# Patient Record
Sex: MALE | Race: WHITE | Employment: FULL TIME | URBAN - METROPOLITAN AREA
[De-identification: names, ages, dates, MRNs, and addresses within clinical notes are randomized per-mention and may not be internally consistent; named-entity substitution may affect disease eponyms.]

---

## 2017-01-17 PROBLEM — E10.42 TYPE 1 DIABETES MELLITUS WITH DIABETIC POLYNEUROPATHY (HCC): Status: ACTIVE | Noted: 2017-01-17

## 2017-06-24 PROCEDURE — 82570 ASSAY OF URINE CREATININE: CPT | Performed by: INTERNAL MEDICINE

## 2017-06-24 PROCEDURE — 82043 UR ALBUMIN QUANTITATIVE: CPT | Performed by: INTERNAL MEDICINE

## 2017-07-25 PROBLEM — E03.9 ACQUIRED HYPOTHYROIDISM: Status: ACTIVE | Noted: 2017-07-25

## 2017-07-25 PROBLEM — E10.649 TYPE 1 DIABETES MELLITUS WITH HYPOGLYCEMIA UNAWARENESS (HCC): Status: ACTIVE | Noted: 2017-07-25

## 2019-07-09 PROCEDURE — 86803 HEPATITIS C AB TEST: CPT | Performed by: INTERNAL MEDICINE

## 2019-07-11 PROBLEM — I25.10 CORONARY ARTERY DISEASE INVOLVING NATIVE CORONARY ARTERY: Status: ACTIVE | Noted: 2019-07-11

## 2020-10-01 ENCOUNTER — OFFICE VISIT (OUTPATIENT)
Dept: OPHTHALMOLOGY | Facility: CLINIC | Age: 60
End: 2020-10-01
Payer: COMMERCIAL

## 2020-10-01 DIAGNOSIS — H25.13 AGE-RELATED NUCLEAR CATARACT OF BOTH EYES: ICD-10-CM

## 2020-10-01 DIAGNOSIS — E10.3292 CONTROLLED TYPE 1 DIABETES MELLITUS WITH LEFT EYE AFFECTED BY MILD NONPROLIFERATIVE RETINOPATHY WITHOUT MACULAR EDEMA (HCC): ICD-10-CM

## 2020-10-01 DIAGNOSIS — H40.003 GLAUCOMA SUSPECT OF BOTH EYES: Primary | ICD-10-CM

## 2020-10-01 PROCEDURE — 92250 FUNDUS PHOTOGRAPHY W/I&R: CPT | Performed by: OPHTHALMOLOGY

## 2020-10-01 PROCEDURE — 92015 DETERMINE REFRACTIVE STATE: CPT | Performed by: OPHTHALMOLOGY

## 2020-10-01 PROCEDURE — 92004 COMPRE OPH EXAM NEW PT 1/>: CPT | Performed by: OPHTHALMOLOGY

## 2020-10-01 NOTE — PROGRESS NOTES
Edilson Hull is a 64year old male.     HPI:     HPI     Diabetic Eye Exam      Additional comments: Pt has been a diabetic for 64 years       Pt's diabetes is currently controlled by insulin  Pt checks BS 4x a day   Pt's last blood sugar was 135  Last CORRECTION SCALE, MAX DOSE OF 40 UNITS SUBCUTANEOUS DAILY 45 mL 1   • Glucose Blood (CONTOUR NEXT TEST) In Vitro Strip TEST THREE TIMES DAILY 300 strip 3   • Insulin Pen Needle (BD PEN NEEDLE RIKI U/F) 32G X 4 MM Does not apply Misc Use 5 times daily 500 e MA above fovea     Vessels Normal Normal    Periphery Normal Normal            Refraction     Wearing Rx       Sphere Cylinder Axis Add    Right -3.25 +1.25 115 +1.75    Left -4.00 +1.25 090 +1.75    Type: Progressive bifocal          Manifest Refraction

## 2020-10-01 NOTE — ASSESSMENT & PLAN NOTE
Discussed with patient that he is a glaucoma suspect based on enlargement of the optic nerve in the left eye. Photos were taken today to document optic nerves.    Will have patient back for next available visual field, OCT and pachy with no MD. Spoke to patient. All questions answered. Advised patient will need to schedule an appointment with his PCP for the medical clearance. Verbalized understanding.

## 2020-10-01 NOTE — ASSESSMENT & PLAN NOTE
Mild diabetic changes in the left eye. No treatment is needed at this time. Told patient he does not need to see Dr. Aiyana Juarez at this time, but it would be up to the patient and Dr. Aiyana Juarez.

## 2020-10-01 NOTE — PATIENT INSTRUCTIONS
Glaucoma suspect of both eyes  Discussed with patient that he is a glaucoma suspect based on enlargement of the optic nerve in the left eye. Photos were taken today to document optic nerves.    Will have patient back for next available visual field, OCT It usually comes on quickly. The drainage area in the eye suddenly becomes completely blocked. Eye pressure builds rapidly. You may notice blurred vision and rainbow halos around lights. You may also have headaches, nausea, vomiting, and severe pain.  If no eye exam  Your eye healthcare provider uses an eye chart and other tools to check your vision. Then he or she checks your eyes for any disease. You are given eye drops to widen (dilate) your pupils.  You may have 1 or more of these tests:   · Tonometry.  Th your health. Managing your diabetes and other health problems can make this eye condition less likely. You must also see an eye care provider about once a year.  They will do a dilated eye exam. This helps find any problems with the blood vessels of your re Retinopathy? Diabetic retinopathy is the main cause of blindness in adults. It happens when diabetes harms blood vessels in the eye. These weak vessels leak fluid into a part of the eye called the retina.  New blood vessels can break and bleed into the ret

## 2020-11-18 ENCOUNTER — NURSE ONLY (OUTPATIENT)
Dept: OPHTHALMOLOGY | Facility: CLINIC | Age: 60
End: 2020-11-18
Payer: COMMERCIAL

## 2020-11-18 DIAGNOSIS — H40.003 GLAUCOMA SUSPECT OF BOTH EYES: ICD-10-CM

## 2020-11-18 PROCEDURE — 92083 EXTENDED VISUAL FIELD XM: CPT | Performed by: OPHTHALMOLOGY

## 2020-11-18 PROCEDURE — 92133 CPTRZD OPH DX IMG PST SGM ON: CPT | Performed by: OPHTHALMOLOGY

## 2020-11-18 PROCEDURE — 76514 ECHO EXAM OF EYE THICKNESS: CPT | Performed by: OPHTHALMOLOGY

## 2020-11-18 NOTE — PROGRESS NOTES
Robert Comment is a 64year old male.     HPI:     HPI     Pt in today for VF, OCT and Pachy with no MD.     Last edited by Lillie Tobin OT on 11/18/2020  8:04 AM. (History)        Patient History:  Past Medical History:   Diagnosis Date   • Alcoholi nightly. 100 tablet 3   • aspirin 81 MG Oral Tab Take 1 tablet (81 mg total) by mouth daily.  100 tablet 3       Allergies:  No Known Allergies    ROS:       PHYSICAL EXAM:     Base Eye Exam     Pachymetry (11/18/2020)       Right Left    Thickness 579/-2 5

## 2020-11-19 ENCOUNTER — TELEPHONE (OUTPATIENT)
Dept: OPHTHALMOLOGY | Facility: CLINIC | Age: 60
End: 2020-11-19

## 2021-01-26 PROBLEM — E10.3292 CONTROLLED TYPE 1 DIABETES MELLITUS WITH LEFT EYE AFFECTED BY MILD NONPROLIFERATIVE RETINOPATHY WITHOUT MACULAR EDEMA (HCC): Status: RESOLVED | Noted: 2017-07-25 | Resolved: 2021-01-26

## 2022-12-14 ENCOUNTER — OFFICE VISIT (OUTPATIENT)
Dept: OPHTHALMOLOGY | Facility: CLINIC | Age: 62
End: 2022-12-14
Payer: COMMERCIAL

## 2022-12-14 DIAGNOSIS — E10.9 TYPE 1 DIABETES MELLITUS WITHOUT RETINOPATHY (HCC): ICD-10-CM

## 2022-12-14 DIAGNOSIS — H40.003 GLAUCOMA SUSPECT OF BOTH EYES: Primary | ICD-10-CM

## 2022-12-14 DIAGNOSIS — H25.13 AGE-RELATED NUCLEAR CATARACT OF BOTH EYES: ICD-10-CM

## 2022-12-14 NOTE — ASSESSMENT & PLAN NOTE
Discussed early cataracts with patient. Told patient that cataracts are age appropriate and they are not surgical at this time. No treatment recommended at this time.      New glasses Rx given today, update as needed 1

## 2022-12-14 NOTE — ASSESSMENT & PLAN NOTE
Discussed with patient that he is a glaucoma suspect based on increased cupping of the optic nerves in both eyes. Retinal photos taken today to document optic nerves. Glaucoma diagnostic testing ordered. Will not start medication, but will continue to observe. Patient verbalized understanding.     Will see patient for next available visual field and OCT with no MD, if diagnostic testing is normal will see patient in 1 year for a diabetic exam

## 2022-12-27 ENCOUNTER — NURSE ONLY (OUTPATIENT)
Dept: OPHTHALMOLOGY | Facility: CLINIC | Age: 62
End: 2022-12-27
Payer: COMMERCIAL

## 2022-12-27 DIAGNOSIS — H40.003 GLAUCOMA SUSPECT OF BOTH EYES: ICD-10-CM

## 2022-12-27 PROCEDURE — 92133 CPTRZD OPH DX IMG PST SGM ON: CPT | Performed by: OPHTHALMOLOGY

## 2022-12-27 PROCEDURE — 92083 EXTENDED VISUAL FIELD XM: CPT | Performed by: OPHTHALMOLOGY

## 2023-09-18 ENCOUNTER — APPOINTMENT (OUTPATIENT)
Dept: CT IMAGING | Age: 63
DRG: 234 | End: 2023-09-18
Attending: EMERGENCY MEDICINE

## 2023-09-18 ENCOUNTER — APPOINTMENT (OUTPATIENT)
Dept: GENERAL RADIOLOGY | Age: 63
DRG: 234 | End: 2023-09-18
Attending: EMERGENCY MEDICINE

## 2023-09-18 ENCOUNTER — HOSPITAL ENCOUNTER (INPATIENT)
Age: 63
LOS: 7 days | Discharge: HOME HEALTH | DRG: 234 | End: 2023-09-25
Attending: EMERGENCY MEDICINE | Admitting: HOSPITALIST

## 2023-09-18 DIAGNOSIS — G89.18 POST-OP PAIN: ICD-10-CM

## 2023-09-18 DIAGNOSIS — I21.4 NSTEMI (NON-ST ELEVATED MYOCARDIAL INFARCTION) (CMD): Primary | ICD-10-CM

## 2023-09-18 LAB
ALBUMIN SERPL-MCNC: 3.9 G/DL (ref 3.6–5.1)
ALBUMIN/GLOB SERPL: 1.2 {RATIO} (ref 1–2.4)
ALP SERPL-CCNC: 63 UNITS/L (ref 45–117)
ALT SERPL-CCNC: 40 UNITS/L
ANION GAP SERPL CALC-SCNC: 9 MMOL/L (ref 7–19)
APTT PPP: 28 SEC (ref 22–32)
AST SERPL-CCNC: 74 UNITS/L
BASOPHILS # BLD: 0.1 K/MCL (ref 0–0.3)
BASOPHILS NFR BLD: 1 %
BILIRUB SERPL-MCNC: 0.9 MG/DL (ref 0.2–1)
BUN SERPL-MCNC: 23 MG/DL (ref 6–20)
BUN/CREAT SERPL: 13 (ref 7–25)
CALCIUM SERPL-MCNC: 9.5 MG/DL (ref 8.4–10.2)
CHLORIDE SERPL-SCNC: 105 MMOL/L (ref 97–110)
CO2 SERPL-SCNC: 27 MMOL/L (ref 21–32)
CREAT SERPL-MCNC: 1.73 MG/DL (ref 0.67–1.17)
DEPRECATED RDW RBC: 39.8 FL (ref 39–50)
EGFRCR SERPLBLD CKD-EPI 2021: 44 ML/MIN/{1.73_M2}
EOSINOPHIL # BLD: 0.1 K/MCL (ref 0–0.5)
EOSINOPHIL NFR BLD: 1 %
ERYTHROCYTE [DISTWIDTH] IN BLOOD: 12.1 % (ref 11–15)
FASTING DURATION TIME PATIENT: ABNORMAL H
GLOBULIN SER-MCNC: 3.3 G/DL (ref 2–4)
GLUCOSE SERPL-MCNC: 202 MG/DL (ref 70–99)
HCT VFR BLD CALC: 42.7 % (ref 39–51)
HGB BLD-MCNC: 14.4 G/DL (ref 13–17)
IMM GRANULOCYTES # BLD AUTO: 0 K/MCL (ref 0–0.2)
IMM GRANULOCYTES # BLD: 0 %
INR PPP: 1
LYMPHOCYTES # BLD: 1.7 K/MCL (ref 1–4)
LYMPHOCYTES NFR BLD: 15 %
MCH RBC QN AUTO: 30.2 PG (ref 26–34)
MCHC RBC AUTO-ENTMCNC: 33.7 G/DL (ref 32–36.5)
MCV RBC AUTO: 89.5 FL (ref 78–100)
MONOCYTES # BLD: 1.1 K/MCL (ref 0.3–0.9)
MONOCYTES NFR BLD: 10 %
NEUTROPHILS # BLD: 7.8 K/MCL (ref 1.8–7.7)
NEUTROPHILS NFR BLD: 73 %
NRBC BLD MANUAL-RTO: 0 /100 WBC
PLATELET # BLD AUTO: 216 K/MCL (ref 140–450)
POTASSIUM SERPL-SCNC: 3.8 MMOL/L (ref 3.4–5.1)
PROT SERPL-MCNC: 7.2 G/DL (ref 6.4–8.2)
PROTHROMBIN TIME: 10.9 SEC (ref 9.7–11.8)
RBC # BLD: 4.77 MIL/MCL (ref 4.5–5.9)
SODIUM SERPL-SCNC: 137 MMOL/L (ref 135–145)
TROPONIN I SERPL DL<=0.01 NG/ML-MCNC: 4426 NG/L
TROPONIN I SERPL DL<=0.01 NG/ML-MCNC: 7284 NG/L
WBC # BLD: 10.7 K/MCL (ref 4.2–11)

## 2023-09-18 PROCEDURE — 96372 THER/PROPH/DIAG INJ SC/IM: CPT | Performed by: HOSPITALIST

## 2023-09-18 PROCEDURE — 71045 X-RAY EXAM CHEST 1 VIEW: CPT

## 2023-09-18 PROCEDURE — 10006031 HB ROOM CHARGE TELEMETRY

## 2023-09-18 PROCEDURE — 74174 CTA ABD&PLVS W/CONTRAST: CPT

## 2023-09-18 PROCEDURE — G1004 CDSM NDSC: HCPCS

## 2023-09-18 PROCEDURE — 10002800 HB RX 250 W HCPCS: Performed by: HOSPITALIST

## 2023-09-18 PROCEDURE — 10004651 HB RX, NO CHARGE ITEM: Performed by: EMERGENCY MEDICINE

## 2023-09-18 PROCEDURE — 10002803 HB RX 637: Performed by: EMERGENCY MEDICINE

## 2023-09-18 PROCEDURE — 84484 ASSAY OF TROPONIN QUANT: CPT | Performed by: EMERGENCY MEDICINE

## 2023-09-18 PROCEDURE — 83036 HEMOGLOBIN GLYCOSYLATED A1C: CPT | Performed by: PHYSICIAN ASSISTANT

## 2023-09-18 PROCEDURE — 10002800 HB RX 250 W HCPCS: Performed by: EMERGENCY MEDICINE

## 2023-09-18 PROCEDURE — 80053 COMPREHEN METABOLIC PANEL: CPT | Performed by: EMERGENCY MEDICINE

## 2023-09-18 PROCEDURE — 99291 CRITICAL CARE FIRST HOUR: CPT

## 2023-09-18 PROCEDURE — 93005 ELECTROCARDIOGRAM TRACING: CPT | Performed by: EMERGENCY MEDICINE

## 2023-09-18 PROCEDURE — 85025 COMPLETE CBC W/AUTO DIFF WBC: CPT | Performed by: EMERGENCY MEDICINE

## 2023-09-18 PROCEDURE — 85610 PROTHROMBIN TIME: CPT | Performed by: EMERGENCY MEDICINE

## 2023-09-18 PROCEDURE — 93005 ELECTROCARDIOGRAM TRACING: CPT | Performed by: HOSPITALIST

## 2023-09-18 PROCEDURE — 10002805 HB CONTRAST AGENT: Performed by: EMERGENCY MEDICINE

## 2023-09-18 PROCEDURE — 71275 CT ANGIOGRAPHY CHEST: CPT

## 2023-09-18 PROCEDURE — 96374 THER/PROPH/DIAG INJ IV PUSH: CPT

## 2023-09-18 PROCEDURE — 85730 THROMBOPLASTIN TIME PARTIAL: CPT | Performed by: EMERGENCY MEDICINE

## 2023-09-18 PROCEDURE — 93010 ELECTROCARDIOGRAM REPORT: CPT | Performed by: SPECIALIST

## 2023-09-18 RX ORDER — 0.9 % SODIUM CHLORIDE 0.9 %
2 VIAL (ML) INJECTION EVERY 12 HOURS SCHEDULED
Status: DISCONTINUED | OUTPATIENT
Start: 2023-09-19 | End: 2023-09-20

## 2023-09-18 RX ORDER — HEPARIN SODIUM 1000 [USP'U]/ML
60 INJECTION, SOLUTION INTRAVENOUS; SUBCUTANEOUS PRN
Status: DISCONTINUED | OUTPATIENT
Start: 2023-09-18 | End: 2023-09-20

## 2023-09-18 RX ORDER — HEPARIN SODIUM 10000 [USP'U]/100ML
1-30 INJECTION, SOLUTION INTRAVENOUS CONTINUOUS
Status: DISCONTINUED | OUTPATIENT
Start: 2023-09-18 | End: 2023-09-20

## 2023-09-18 RX ORDER — INSULIN GLARGINE 100 [IU]/ML
14 INJECTION, SOLUTION SUBCUTANEOUS ONCE
Status: COMPLETED | OUTPATIENT
Start: 2023-09-19 | End: 2023-09-18

## 2023-09-18 RX ORDER — ASPIRIN 81 MG/1
324 TABLET, CHEWABLE ORAL ONCE
Status: COMPLETED | OUTPATIENT
Start: 2023-09-18 | End: 2023-09-18

## 2023-09-18 RX ORDER — HEPARIN SODIUM 1000 [USP'U]/ML
60 INJECTION, SOLUTION INTRAVENOUS; SUBCUTANEOUS ONCE
Status: COMPLETED | OUTPATIENT
Start: 2023-09-18 | End: 2023-09-18

## 2023-09-18 RX ORDER — HEPARIN SODIUM 1000 [USP'U]/ML
30 INJECTION, SOLUTION INTRAVENOUS; SUBCUTANEOUS PRN
Status: DISCONTINUED | OUTPATIENT
Start: 2023-09-18 | End: 2023-09-20

## 2023-09-18 RX ADMIN — IOHEXOL 75 ML: 350 INJECTION, SOLUTION INTRAVENOUS at 20:18

## 2023-09-18 RX ADMIN — INSULIN GLARGINE 14 UNITS: 100 INJECTION, SOLUTION SUBCUTANEOUS at 23:43

## 2023-09-18 RX ADMIN — NITROGLYCERIN 1 INCH: 20 OINTMENT TOPICAL at 20:30

## 2023-09-18 RX ADMIN — HEPARIN SODIUM 3800 UNITS: 1000 INJECTION, SOLUTION INTRAVENOUS; SUBCUTANEOUS at 21:12

## 2023-09-18 RX ADMIN — ASPIRIN 81 MG CHEWABLE TABLET 324 MG: 81 TABLET CHEWABLE at 20:08

## 2023-09-18 RX ADMIN — HEPARIN SODIUM AND DEXTROSE 12 UNITS/KG/HR: 10000; 5 INJECTION INTRAVENOUS at 21:11

## 2023-09-18 SDOH — SOCIAL STABILITY: SOCIAL NETWORK: SUPPORT SYSTEMS: CHILDREN;FAMILY MEMBERS

## 2023-09-18 SDOH — ECONOMIC STABILITY: GENERAL

## 2023-09-18 SDOH — HEALTH STABILITY: PHYSICAL HEALTH: DO YOU HAVE DIFFICULTY DRESSING OR BATHING?: NO

## 2023-09-18 SDOH — ECONOMIC STABILITY: TRANSPORTATION INSECURITY
IN THE PAST 12 MONTHS, HAS THE LACK OF TRANSPORTATION KEPT YOU FROM MEDICAL APPOINTMENTS OR FROM GETTING MEDICATIONS?: NO

## 2023-09-18 SDOH — HEALTH STABILITY: PHYSICAL HEALTH: DO YOU HAVE SERIOUS DIFFICULTY WALKING OR CLIMBING STAIRS?: NO

## 2023-09-18 SDOH — ECONOMIC STABILITY: HOUSING INSECURITY: WHAT IS YOUR LIVING SITUATION TODAY?: HOUSE

## 2023-09-18 SDOH — ECONOMIC STABILITY: TRANSPORTATION INSECURITY
IN THE PAST 12 MONTHS, HAS LACK OF TRANSPORTATION KEPT YOU FROM MEETINGS, WORK, OR FROM GETTING THINGS NEEDED FOR DAILY LIVING?: NO

## 2023-09-18 SDOH — HEALTH STABILITY: GENERAL
BECAUSE OF A PHYSICAL, MENTAL, OR EMOTIONAL CONDITION, DO YOU HAVE SERIOUS DIFFICULTY CONCENTRATING, REMEMBERING OR MAKING DECISIONS?: NO

## 2023-09-18 SDOH — SOCIAL STABILITY: SOCIAL NETWORK
HOW OFTEN DO YOU SEE OR TALK TO PEOPLE THAT YOU CARE ABOUT AND FEEL CLOSE TO? (FOR EXAMPLE: TALKING TO FRIENDS ON THE PHONE, VISITING FRIENDS OR FAMILY, GOING TO CHURCH OR CLUB MEETINGS): 5 OR MORE TIMES A WEEK

## 2023-09-18 SDOH — HEALTH STABILITY: GENERAL: BECAUSE OF A PHYSICAL, MENTAL, OR EMOTIONAL CONDITION, DO YOU HAVE DIFFICULTY DOING ERRANDS ALONE?: NO

## 2023-09-18 SDOH — ECONOMIC STABILITY: HOUSING INSECURITY: WHAT IS YOUR LIVING SITUATION TODAY?: SPOUSE;ADULT CHILDREN

## 2023-09-18 SDOH — ECONOMIC STABILITY: HOUSING INSECURITY: ARE YOU WORRIED ABOUT LOSING YOUR HOUSING?: NO

## 2023-09-18 SDOH — ECONOMIC STABILITY: FOOD INSECURITY: HOW OFTEN IN THE PAST 12 MONTHS WERE YOU WORRIED OR STRESSED ABOUT HAVING ENOUGH MONEY TO BUY NUTRITIOUS MEALS?: NEVER

## 2023-09-18 ASSESSMENT — PAIN SCALES - GENERAL
PAINLEVEL_OUTOF10: 2
PAINLEVEL_OUTOF10: 0
PAINLEVEL_OUTOF10: 5

## 2023-09-18 ASSESSMENT — ENCOUNTER SYMPTOMS
HEADACHES: 0
NUMBNESS: 1
AGITATION: 0
FEVER: 0
SORE THROAT: 0
COUGH: 0
CHILLS: 0
VOMITING: 0
NAUSEA: 0
ABDOMINAL PAIN: 0
SHORTNESS OF BREATH: 0

## 2023-09-18 ASSESSMENT — LIFESTYLE VARIABLES
HOW OFTEN DO YOU HAVE A DRINK CONTAINING ALCOHOL: NEVER
HOW MANY STANDARD DRINKS CONTAINING ALCOHOL DO YOU HAVE ON A TYPICAL DAY: 0,1 OR 2
AUDIT-C TOTAL SCORE: 0
ALCOHOL_USE_STATUS: NO OR LOW RISK WITH VALIDATED TOOL
HOW OFTEN DO YOU HAVE 6 OR MORE DRINKS ON ONE OCCASION: NEVER

## 2023-09-18 ASSESSMENT — ACTIVITIES OF DAILY LIVING (ADL)
RECENT_DECLINE_ADL: NO
ADL_SHORT_OF_BREATH: NO
ADL_BEFORE_ADMISSION: INDEPENDENT
ADL_SCORE: 12

## 2023-09-18 ASSESSMENT — PATIENT HEALTH QUESTIONNAIRE - PHQ9
IS PATIENT ABLE TO COMPLETE PHQ2 OR PHQ9: YES
SUM OF ALL RESPONSES TO PHQ9 QUESTIONS 1 AND 2: 0
CLINICAL INTERPRETATION OF PHQ2 SCORE: NO FURTHER SCREENING NEEDED

## 2023-09-19 ENCOUNTER — APPOINTMENT (OUTPATIENT)
Dept: ULTRASOUND IMAGING | Age: 63
DRG: 234 | End: 2023-09-19
Attending: NURSE PRACTITIONER

## 2023-09-19 ENCOUNTER — APPOINTMENT (OUTPATIENT)
Dept: CARDIOLOGY | Age: 63
DRG: 234 | End: 2023-09-19
Attending: NURSE PRACTITIONER

## 2023-09-19 ENCOUNTER — ANESTHESIA EVENT (OUTPATIENT)
Dept: SURGERY | Age: 63
DRG: 234 | End: 2023-09-19

## 2023-09-19 LAB
ABO + RH BLD: NORMAL
ANION GAP SERPL CALC-SCNC: 9 MMOL/L (ref 7–19)
APPEARANCE UR: CLEAR
APTT PPP: 44 SEC (ref 22–32)
APTT PPP: 50 SEC (ref 22–32)
APTT PPP: 53 SEC (ref 22–32)
ASCENDING AORTA (AAD): 3
ATRIAL RATE (BPM): 59
ATRIAL RATE (BPM): 72
ATRIAL RATE (BPM): 72
AV STENOSIS SEVERITY TEXT: NORMAL
BASOPHILS # BLD: 0.1 K/MCL (ref 0–0.3)
BASOPHILS NFR BLD: 1 %
BILIRUB UR QL STRIP: NEGATIVE
BLD GP AB SCN SERPL QL GEL: NEGATIVE
BUN SERPL-MCNC: 22 MG/DL (ref 6–20)
BUN/CREAT SERPL: 22 (ref 7–25)
CALCIUM SERPL-MCNC: 8.5 MG/DL (ref 8.4–10.2)
CHLORIDE SERPL-SCNC: 107 MMOL/L (ref 97–110)
CO2 SERPL-SCNC: 25 MMOL/L (ref 21–32)
COLOR UR: ABNORMAL
CREAT SERPL-MCNC: 1.01 MG/DL (ref 0.67–1.17)
DEPRECATED RDW RBC: 40.7 FL (ref 39–50)
EGFRCR SERPLBLD CKD-EPI 2021: 85 ML/MIN/{1.73_M2}
EOSINOPHIL # BLD: 0.1 K/MCL (ref 0–0.5)
EOSINOPHIL NFR BLD: 1 %
ERYTHROCYTE [DISTWIDTH] IN BLOOD: 12.3 % (ref 11–15)
FASTING DURATION TIME PATIENT: ABNORMAL H
GLUCOSE BLDC GLUCOMTR-MCNC: 297 MG/DL (ref 70–99)
GLUCOSE SERPL-MCNC: 242 MG/DL (ref 70–99)
GLUCOSE UR STRIP-MCNC: 300 MG/DL
HCT VFR BLD CALC: 38.1 % (ref 39–51)
HGB BLD-MCNC: 12.7 G/DL (ref 13–17)
HGB UR QL STRIP: NEGATIVE
IMM GRANULOCYTES # BLD AUTO: 0 K/MCL (ref 0–0.2)
IMM GRANULOCYTES # BLD: 0 %
KETONES UR STRIP-MCNC: NEGATIVE MG/DL
LEUKOCYTE ESTERASE UR QL STRIP: NEGATIVE
LV EF: NORMAL %
LYMPHOCYTES # BLD: 1.4 K/MCL (ref 1–4)
LYMPHOCYTES NFR BLD: 15 %
MAGNESIUM SERPL-MCNC: 2.2 MG/DL (ref 1.7–2.4)
MCH RBC QN AUTO: 30 PG (ref 26–34)
MCHC RBC AUTO-ENTMCNC: 33.3 G/DL (ref 32–36.5)
MCV RBC AUTO: 90.1 FL (ref 78–100)
MONOCYTES # BLD: 0.9 K/MCL (ref 0.3–0.9)
MONOCYTES NFR BLD: 10 %
MRSA DNA SPEC QL NAA+PROBE: NOT DETECTED
NEUTROPHILS # BLD: 7 K/MCL (ref 1.8–7.7)
NEUTROPHILS NFR BLD: 73 %
NITRITE UR QL STRIP: NEGATIVE
NRBC BLD MANUAL-RTO: 0 /100 WBC
P AXIS (DEGREES): 35
P AXIS (DEGREES): 37
P AXIS (DEGREES): 54
PH UR STRIP: 6 [PH] (ref 5–7)
PLATELET # BLD AUTO: 178 K/MCL (ref 140–450)
POTASSIUM SERPL-SCNC: 3.9 MMOL/L (ref 3.4–5.1)
PR-INTERVAL (MSEC): 120
PR-INTERVAL (MSEC): 134
PR-INTERVAL (MSEC): 142
PROT UR STRIP-MCNC: NEGATIVE MG/DL
QRS-INTERVAL (MSEC): 84
QRS-INTERVAL (MSEC): 90
QRS-INTERVAL (MSEC): 94
QT-INTERVAL (MSEC): 406
QT-INTERVAL (MSEC): 428
QT-INTERVAL (MSEC): 438
QTC: 434
QTC: 444
QTC: 468
R AXIS (DEGREES): 4
R AXIS (DEGREES): 44
R AXIS (DEGREES): 61
RAINBOW EXTRA TUBES HOLD SPECIMEN: NORMAL
RBC # BLD: 4.23 MIL/MCL (ref 4.5–5.9)
REPORT TEXT: NORMAL
SODIUM SERPL-SCNC: 137 MMOL/L (ref 135–145)
SP GR UR STRIP: 1.02 (ref 1–1.03)
T AXIS (DEGREES): 60
T AXIS (DEGREES): 71
T AXIS (DEGREES): 92
TROPONIN I SERPL DL<=0.01 NG/ML-MCNC: ABNORMAL NG/L
TYPE AND SCREEN EXPIRATION DATE: NORMAL
UROBILINOGEN UR STRIP-MCNC: 0.2 MG/DL
VENTRICULAR RATE EKG/MIN (BPM): 59
VENTRICULAR RATE EKG/MIN (BPM): 72
VENTRICULAR RATE EKG/MIN (BPM): 72
WBC # BLD: 9.5 K/MCL (ref 4.2–11)

## 2023-09-19 PROCEDURE — 10004651 HB RX, NO CHARGE ITEM: Performed by: HOSPITALIST

## 2023-09-19 PROCEDURE — 93880 EXTRACRANIAL BILAT STUDY: CPT

## 2023-09-19 PROCEDURE — 10004651 HB RX, NO CHARGE ITEM: Performed by: INTERNAL MEDICINE

## 2023-09-19 PROCEDURE — 87641 MR-STAPH DNA AMP PROBE: CPT | Performed by: PHYSICIAN ASSISTANT

## 2023-09-19 PROCEDURE — 10002801 HB RX 250 W/O HCPCS: Performed by: INTERNAL MEDICINE

## 2023-09-19 PROCEDURE — B2111ZZ FLUOROSCOPY OF MULTIPLE CORONARY ARTERIES USING LOW OSMOLAR CONTRAST: ICD-10-PCS | Performed by: INTERNAL MEDICINE

## 2023-09-19 PROCEDURE — 10002800 HB RX 250 W HCPCS: Performed by: EMERGENCY MEDICINE

## 2023-09-19 PROCEDURE — 10004651 HB RX, NO CHARGE ITEM: Performed by: PHYSICIAN ASSISTANT

## 2023-09-19 PROCEDURE — 81003 URINALYSIS AUTO W/O SCOPE: CPT | Performed by: PHYSICIAN ASSISTANT

## 2023-09-19 PROCEDURE — 84484 ASSAY OF TROPONIN QUANT: CPT | Performed by: PHYSICIAN ASSISTANT

## 2023-09-19 PROCEDURE — 10006031 HB ROOM CHARGE TELEMETRY

## 2023-09-19 PROCEDURE — 85025 COMPLETE CBC W/AUTO DIFF WBC: CPT | Performed by: HOSPITALIST

## 2023-09-19 PROCEDURE — 10004651 HB RX, NO CHARGE ITEM

## 2023-09-19 PROCEDURE — 85730 THROMBOPLASTIN TIME PARTIAL: CPT | Performed by: EMERGENCY MEDICINE

## 2023-09-19 PROCEDURE — 99152 MOD SED SAME PHYS/QHP 5/>YRS: CPT | Performed by: INTERNAL MEDICINE

## 2023-09-19 PROCEDURE — C1887 CATHETER, GUIDING: HCPCS | Performed by: INTERNAL MEDICINE

## 2023-09-19 PROCEDURE — C1894 INTRO/SHEATH, NON-LASER: HCPCS | Performed by: INTERNAL MEDICINE

## 2023-09-19 PROCEDURE — 93458 L HRT ARTERY/VENTRICLE ANGIO: CPT | Performed by: INTERNAL MEDICINE

## 2023-09-19 PROCEDURE — 93306 TTE W/DOPPLER COMPLETE: CPT

## 2023-09-19 PROCEDURE — 10002807 HB RX 258: Performed by: PHYSICIAN ASSISTANT

## 2023-09-19 PROCEDURE — 10002805 HB CONTRAST AGENT: Performed by: INTERNAL MEDICINE

## 2023-09-19 PROCEDURE — 83735 ASSAY OF MAGNESIUM: CPT | Performed by: HOSPITALIST

## 2023-09-19 PROCEDURE — C1769 GUIDE WIRE: HCPCS | Performed by: INTERNAL MEDICINE

## 2023-09-19 PROCEDURE — 86901 BLOOD TYPING SEROLOGIC RH(D): CPT | Performed by: PHYSICIAN ASSISTANT

## 2023-09-19 PROCEDURE — 10006023 HB SUPPLY 272: Performed by: INTERNAL MEDICINE

## 2023-09-19 PROCEDURE — 80048 BASIC METABOLIC PNL TOTAL CA: CPT | Performed by: HOSPITALIST

## 2023-09-19 PROCEDURE — 96372 THER/PROPH/DIAG INJ SC/IM: CPT | Performed by: HOSPITALIST

## 2023-09-19 PROCEDURE — 10002800 HB RX 250 W HCPCS: Performed by: HOSPITALIST

## 2023-09-19 PROCEDURE — 4A023N7 MEASUREMENT OF CARDIAC SAMPLING AND PRESSURE, LEFT HEART, PERCUTANEOUS APPROACH: ICD-10-PCS | Performed by: INTERNAL MEDICINE

## 2023-09-19 PROCEDURE — B2151ZZ FLUOROSCOPY OF LEFT HEART USING LOW OSMOLAR CONTRAST: ICD-10-PCS | Performed by: INTERNAL MEDICINE

## 2023-09-19 PROCEDURE — 10002800 HB RX 250 W HCPCS: Performed by: INTERNAL MEDICINE

## 2023-09-19 PROCEDURE — 10002803 HB RX 637: Performed by: HOSPITALIST

## 2023-09-19 PROCEDURE — 85730 THROMBOPLASTIN TIME PARTIAL: CPT | Performed by: HOSPITALIST

## 2023-09-19 PROCEDURE — 10002807 HB RX 258: Performed by: INTERNAL MEDICINE

## 2023-09-19 PROCEDURE — 36415 COLL VENOUS BLD VENIPUNCTURE: CPT | Performed by: HOSPITALIST

## 2023-09-19 RX ORDER — HYDRALAZINE HYDROCHLORIDE 20 MG/ML
10 INJECTION INTRAMUSCULAR; INTRAVENOUS EVERY 4 HOURS PRN
Status: DISCONTINUED | OUTPATIENT
Start: 2023-09-19 | End: 2023-09-19 | Stop reason: HOSPADM

## 2023-09-19 RX ORDER — CLONIDINE HYDROCHLORIDE 0.1 MG/1
0.1 TABLET ORAL EVERY 4 HOURS PRN
Status: DISCONTINUED | OUTPATIENT
Start: 2023-09-19 | End: 2023-09-19 | Stop reason: HOSPADM

## 2023-09-19 RX ORDER — HYDRALAZINE HYDROCHLORIDE 20 MG/ML
10 INJECTION INTRAMUSCULAR; INTRAVENOUS EVERY 4 HOURS PRN
Status: DISCONTINUED | OUTPATIENT
Start: 2023-09-19 | End: 2023-09-20

## 2023-09-19 RX ORDER — ASPIRIN 325 MG
325 TABLET ORAL ONCE
Status: COMPLETED | OUTPATIENT
Start: 2023-09-19 | End: 2023-09-19

## 2023-09-19 RX ORDER — DEXTROSE MONOHYDRATE 25 G/50ML
12.5 INJECTION, SOLUTION INTRAVENOUS PRN
Status: DISCONTINUED | OUTPATIENT
Start: 2023-09-19 | End: 2023-09-20

## 2023-09-19 RX ORDER — PROCHLORPERAZINE EDISYLATE 5 MG/ML
5 INJECTION INTRAMUSCULAR; INTRAVENOUS EVERY 4 HOURS PRN
Status: DISCONTINUED | OUTPATIENT
Start: 2023-09-19 | End: 2023-09-20

## 2023-09-19 RX ORDER — NICOTINE POLACRILEX 4 MG
30 LOZENGE BUCCAL PRN
Status: DISCONTINUED | OUTPATIENT
Start: 2023-09-19 | End: 2023-09-20

## 2023-09-19 RX ORDER — NICOTINE POLACRILEX 4 MG
15 LOZENGE BUCCAL PRN
Status: DISCONTINUED | OUTPATIENT
Start: 2023-09-19 | End: 2023-09-20

## 2023-09-19 RX ORDER — ONDANSETRON 2 MG/ML
4 INJECTION INTRAMUSCULAR; INTRAVENOUS 2 TIMES DAILY PRN
Status: DISCONTINUED | OUTPATIENT
Start: 2023-09-19 | End: 2023-09-20

## 2023-09-19 RX ORDER — ROSUVASTATIN CALCIUM 20 MG/1
20 TABLET, COATED ORAL NIGHTLY
Status: DISCONTINUED | OUTPATIENT
Start: 2023-09-19 | End: 2023-09-20

## 2023-09-19 RX ORDER — INSULIN GLARGINE 100 [IU]/ML
12 INJECTION, SOLUTION SUBCUTANEOUS NIGHTLY
COMMUNITY

## 2023-09-19 RX ORDER — CLONIDINE HYDROCHLORIDE 0.1 MG/1
0.1 TABLET ORAL EVERY 4 HOURS PRN
Status: DISCONTINUED | OUTPATIENT
Start: 2023-09-19 | End: 2023-09-20

## 2023-09-19 RX ORDER — INSULIN LISPRO 100 [IU]/ML
4 INJECTION, SOLUTION INTRAVENOUS; SUBCUTANEOUS
Status: DISCONTINUED | OUTPATIENT
Start: 2023-09-19 | End: 2023-09-20

## 2023-09-19 RX ORDER — DEXTROSE MONOHYDRATE 25 G/50ML
25 INJECTION, SOLUTION INTRAVENOUS PRN
Status: DISCONTINUED | OUTPATIENT
Start: 2023-09-19 | End: 2023-09-20

## 2023-09-19 RX ORDER — MIDAZOLAM HYDROCHLORIDE 1 MG/ML
INJECTION, SOLUTION INTRAMUSCULAR; INTRAVENOUS PRN
Status: DISCONTINUED | OUTPATIENT
Start: 2023-09-19 | End: 2023-09-19 | Stop reason: HOSPADM

## 2023-09-19 RX ORDER — LEVOTHYROXINE SODIUM 0.05 MG/1
50 TABLET ORAL
Status: DISCONTINUED | OUTPATIENT
Start: 2023-09-19 | End: 2023-09-25 | Stop reason: HOSPADM

## 2023-09-19 RX ORDER — INSULIN GLARGINE 100 [IU]/ML
12 INJECTION, SOLUTION SUBCUTANEOUS NIGHTLY
Status: DISCONTINUED | OUTPATIENT
Start: 2023-09-19 | End: 2023-09-20

## 2023-09-19 RX ORDER — ASPIRIN 325 MG
TABLET ORAL
Status: COMPLETED
Start: 2023-09-19 | End: 2023-09-19

## 2023-09-19 RX ORDER — LEVOTHYROXINE SODIUM 0.05 MG/1
50 TABLET ORAL DAILY
Status: ON HOLD | COMMUNITY
End: 2023-11-01

## 2023-09-19 RX ORDER — NITROGLYCERIN 0.4 MG/1
0.4 TABLET SUBLINGUAL EVERY 5 MIN PRN
Status: DISCONTINUED | OUTPATIENT
Start: 2023-09-19 | End: 2023-09-20

## 2023-09-19 RX ORDER — ACETAMINOPHEN 325 MG/1
650 TABLET ORAL EVERY 4 HOURS PRN
Status: DISCONTINUED | OUTPATIENT
Start: 2023-09-19 | End: 2023-09-20

## 2023-09-19 RX ORDER — GABAPENTIN 300 MG/1
600 CAPSULE ORAL ONCE
Status: COMPLETED | OUTPATIENT
Start: 2023-09-20 | End: 2023-09-20

## 2023-09-19 RX ORDER — PANTOPRAZOLE SODIUM 40 MG/1
40 TABLET, DELAYED RELEASE ORAL
Status: DISCONTINUED | OUTPATIENT
Start: 2023-09-19 | End: 2023-09-25 | Stop reason: HOSPADM

## 2023-09-19 RX ORDER — ACETAMINOPHEN 650 MG/1
650 SUPPOSITORY RECTAL EVERY 4 HOURS PRN
Status: DISCONTINUED | OUTPATIENT
Start: 2023-09-19 | End: 2023-09-20

## 2023-09-19 RX ORDER — HEPARIN SODIUM 1000 [USP'U]/ML
INJECTION, SOLUTION INTRAVENOUS; SUBCUTANEOUS PRN
Status: DISCONTINUED | OUTPATIENT
Start: 2023-09-19 | End: 2023-09-19 | Stop reason: HOSPADM

## 2023-09-19 RX ORDER — POLYETHYLENE GLYCOL 3350 17 G/17G
17 POWDER, FOR SOLUTION ORAL DAILY PRN
Status: DISCONTINUED | OUTPATIENT
Start: 2023-09-19 | End: 2023-09-20

## 2023-09-19 RX ORDER — SODIUM CHLORIDE 9 MG/ML
INJECTION, SOLUTION INTRAVENOUS CONTINUOUS
Status: DISCONTINUED | OUTPATIENT
Start: 2023-09-19 | End: 2023-09-19

## 2023-09-19 RX ORDER — ROSUVASTATIN CALCIUM 20 MG/1
20 TABLET, COATED ORAL DAILY
COMMUNITY

## 2023-09-19 RX ORDER — 0.9 % SODIUM CHLORIDE 0.9 %
2 VIAL (ML) INJECTION EVERY 12 HOURS SCHEDULED
Status: DISCONTINUED | OUTPATIENT
Start: 2023-09-19 | End: 2023-09-20

## 2023-09-19 RX ORDER — POTASSIUM CHLORIDE 20 MEQ/1
40 TABLET, EXTENDED RELEASE ORAL ONCE
Status: DISCONTINUED | OUTPATIENT
Start: 2023-09-19 | End: 2023-09-20

## 2023-09-19 RX ORDER — MAGNESIUM HYDROXIDE/ALUMINUM HYDROXICE/SIMETHICONE 120; 1200; 1200 MG/30ML; MG/30ML; MG/30ML
30 SUSPENSION ORAL EVERY 4 HOURS PRN
Status: DISCONTINUED | OUTPATIENT
Start: 2023-09-19 | End: 2023-09-20

## 2023-09-19 RX ORDER — ASPIRIN 81 MG/1
81 TABLET, CHEWABLE ORAL DAILY
Status: DISCONTINUED | OUTPATIENT
Start: 2023-09-20 | End: 2023-09-20

## 2023-09-19 RX ADMIN — INSULIN LISPRO 2 UNITS: 100 INJECTION, SOLUTION INTRAVENOUS; SUBCUTANEOUS at 20:58

## 2023-09-19 RX ADMIN — INSULIN GLARGINE 12 UNITS: 100 INJECTION, SOLUTION SUBCUTANEOUS at 20:58

## 2023-09-19 RX ADMIN — SODIUM CHLORIDE, PRESERVATIVE FREE 2 ML: 5 INJECTION INTRAVENOUS at 10:54

## 2023-09-19 RX ADMIN — HEPARIN SODIUM AND DEXTROSE 14 UNITS/KG/HR: 10000; 5 INJECTION INTRAVENOUS at 10:41

## 2023-09-19 RX ADMIN — ROSUVASTATIN CALCIUM 20 MG: 20 TABLET, FILM COATED ORAL at 20:58

## 2023-09-19 RX ADMIN — SODIUM CHLORIDE, PRESERVATIVE FREE 2 ML: 5 INJECTION INTRAVENOUS at 21:00

## 2023-09-19 RX ADMIN — SODIUM CHLORIDE, PRESERVATIVE FREE 2 ML: 5 INJECTION INTRAVENOUS at 12:58

## 2023-09-19 RX ADMIN — HEPARIN SODIUM AND DEXTROSE 14 UNITS/KG/HR: 10000; 5 INJECTION INTRAVENOUS at 16:40

## 2023-09-19 RX ADMIN — HEPARIN SODIUM 1900 UNITS: 1000 INJECTION, SOLUTION INTRAVENOUS; SUBCUTANEOUS at 10:32

## 2023-09-19 RX ADMIN — ASPIRIN 325 MG ORAL TABLET 325 MG: 325 PILL ORAL at 11:03

## 2023-09-19 RX ADMIN — PANTOPRAZOLE SODIUM 40 MG: 40 TABLET, DELAYED RELEASE ORAL at 15:47

## 2023-09-19 RX ADMIN — ASPIRIN 325 MG ORAL TABLET 350 MG: 325 PILL ORAL at 11:05

## 2023-09-19 RX ADMIN — SODIUM CHLORIDE: 9 INJECTION, SOLUTION INTRAVENOUS at 12:57

## 2023-09-19 RX ADMIN — SODIUM CHLORIDE: 9 INJECTION, SOLUTION INTRAVENOUS at 10:42

## 2023-09-19 SDOH — HEALTH STABILITY: GENERAL: BECAUSE OF A PHYSICAL, MENTAL, OR EMOTIONAL CONDITION, DO YOU HAVE DIFFICULTY DOING ERRANDS ALONE?: NO

## 2023-09-19 SDOH — ECONOMIC STABILITY: GENERAL

## 2023-09-19 SDOH — HEALTH STABILITY: PHYSICAL HEALTH: DO YOU HAVE DIFFICULTY DRESSING OR BATHING?: NO

## 2023-09-19 ASSESSMENT — PAIN SCALES - GENERAL
PAINLEVEL_OUTOF10: 0

## 2023-09-20 ENCOUNTER — ANESTHESIA (OUTPATIENT)
Dept: SURGERY | Age: 63
DRG: 234 | End: 2023-09-20

## 2023-09-20 LAB
ANION GAP SERPL CALC-SCNC: 10 MMOL/L (ref 7–19)
APTT PPP: 26 SEC (ref 22–32)
APTT PPP: 32 SEC (ref 22–32)
BASE EXCESS / DEFICIT, ARTERIAL - RESPIRATORY: -1 MMOL/L (ref -2–3)
BASE EXCESS / DEFICIT, ARTERIAL - RESPIRATORY: -1 MMOL/L (ref -2–3)
BASE EXCESS / DEFICIT, ARTERIAL - RESPIRATORY: -2 MMOL/L (ref -2–3)
BASE EXCESS / DEFICIT, ARTERIAL - RESPIRATORY: -2 MMOL/L (ref -2–3)
BASE EXCESS / DEFICIT, ARTERIAL - RESPIRATORY: -3 MMOL/L (ref -2–3)
BASE EXCESS / DEFICIT, ARTERIAL - RESPIRATORY: 1 MMOL/L (ref -2–3)
BASE EXCESS / DEFICIT, ARTERIAL - RESPIRATORY: 1 MMOL/L (ref -2–3)
BASE EXCESS / DEFICIT, MIXED VENOUS - RESPIRATORY: -3 MMOL/L
BASE EXCESS / DEFICIT, VENOUS - RESPIRATORY: 4 MMOL/L (ref -2–2)
BDY SITE: ABNORMAL
BLOOD EXPIRATION DATE: NORMAL
BODY TEMPERATURE: 34 DEGREES C
BODY TEMPERATURE: 34.5 DEGREES C
BODY TEMPERATURE: 36.3 DEGREES C
BODY TEMPERATURE: 36.9 DEGREES C
BODY TEMPERATURE: 37 DEGREES C
BODY TEMPERATURE: 38 DEGREES C
BUN SERPL-MCNC: 13 MG/DL (ref 6–20)
BUN/CREAT SERPL: 18 (ref 7–25)
CA-I BLD-SCNC: 1.05 MMOL/L (ref 1.15–1.29)
CA-I BLD-SCNC: 1.07 MMOL/L (ref 1.15–1.29)
CA-I BLD-SCNC: 1.1 MMOL/L (ref 1.15–1.29)
CA-I BLD-SCNC: 1.1 MMOL/L (ref 1.15–1.29)
CA-I BLD-SCNC: 1.11 MMOL/L (ref 1.15–1.29)
CA-I BLD-SCNC: 1.15 MMOL/L (ref 1.15–1.29)
CA-I BLD-SCNC: 1.22 MMOL/L (ref 1.15–1.29)
CA-I BLD-SCNC: 1.23 MMOL/L (ref 1.15–1.29)
CA-I BLD-SCNC: 1.25 MMOL/L (ref 1.15–1.29)
CALCIUM SERPL-MCNC: 7.5 MG/DL (ref 8.4–10.2)
CHLORIDE BLD-SCNC: 104 MMOL/L (ref 97–110)
CHLORIDE BLD-SCNC: 107 MMOL/L (ref 97–110)
CHLORIDE BLD-SCNC: 108 MMOL/L (ref 97–110)
CHLORIDE BLD-SCNC: 108 MMOL/L (ref 97–110)
CHLORIDE BLD-SCNC: 109 MMOL/L (ref 97–110)
CHLORIDE SERPL-SCNC: 114 MMOL/L (ref 97–110)
CO2 SERPL-SCNC: 25 MMOL/L (ref 21–32)
COHGB MFR BLDMV: 2 %
COHGB MFR BLDV: 0 %
COHGB MFR BLDV: 0.8 %
COHGB MFR BLDV: 1 %
COHGB MFR BLDV: 1 %
COHGB MFR BLDV: 1.2 %
COHGB MFR BLDV: 1.4 %
COHGB MFR BLDV: 1.4 %
COHGB MFR BLDV: 1.5 %
CONDITION: ABNORMAL
CREAT SERPL-MCNC: 0.73 MG/DL (ref 0.67–1.17)
CROSSMATCH RESULT: NORMAL
CROSSMATCH RESULT: NORMAL
DEPRECATED RDW RBC: 42 FL (ref 39–50)
DISPENSE STATUS: NORMAL
EGFRCR SERPLBLD CKD-EPI 2021: >90 ML/MIN/{1.73_M2}
ERYTHROCYTE [DISTWIDTH] IN BLOOD: 12.6 % (ref 11–15)
FASTING DURATION TIME PATIENT: ABNORMAL H
FIBRINOGEN PPP-MCNC: 229 MG/DL (ref 190–425)
FIO2 ON VENT: 40 %
FIO2 ON VENT: 60 %
FIO2 ON VENT: 60 %
GLUCOSE BLD-MCNC: 128 MG/DL (ref 65–99)
GLUCOSE BLD-MCNC: 129 MG/DL (ref 65–99)
GLUCOSE BLD-MCNC: 131 MG/DL (ref 65–99)
GLUCOSE BLD-MCNC: 132 MG/DL (ref 65–99)
GLUCOSE BLD-MCNC: 242 MG/DL (ref 65–99)
GLUCOSE BLD-MCNC: 72 MG/DL (ref 65–99)
GLUCOSE BLD-MCNC: 77 MG/DL (ref 65–99)
GLUCOSE BLD-MCNC: 86 MG/DL (ref 65–99)
GLUCOSE BLD-MCNC: 90 MG/DL (ref 65–99)
GLUCOSE BLDC GLUCOMTR-MCNC: 100 MG/DL (ref 70–99)
GLUCOSE BLDC GLUCOMTR-MCNC: 103 MG/DL (ref 70–99)
GLUCOSE BLDC GLUCOMTR-MCNC: 110 MG/DL (ref 70–99)
GLUCOSE BLDC GLUCOMTR-MCNC: 119 MG/DL (ref 70–99)
GLUCOSE BLDC GLUCOMTR-MCNC: 136 MG/DL (ref 70–99)
GLUCOSE BLDC GLUCOMTR-MCNC: 145 MG/DL (ref 70–99)
GLUCOSE BLDC GLUCOMTR-MCNC: 164 MG/DL (ref 70–99)
GLUCOSE BLDC GLUCOMTR-MCNC: 175 MG/DL (ref 70–99)
GLUCOSE BLDC GLUCOMTR-MCNC: 186 MG/DL (ref 70–99)
GLUCOSE BLDC GLUCOMTR-MCNC: 256 MG/DL (ref 70–99)
GLUCOSE BLDC GLUCOMTR-MCNC: 76 MG/DL (ref 70–99)
GLUCOSE BLDC GLUCOMTR-MCNC: 76 MG/DL (ref 70–99)
GLUCOSE BLDC GLUCOMTR-MCNC: 93 MG/DL (ref 70–99)
GLUCOSE BLDC GLUCOMTR-MCNC: 95 MG/DL (ref 70–99)
GLUCOSE BLDC GLUCOMTR-MCNC: 97 MG/DL (ref 70–99)
GLUCOSE SERPL-MCNC: 135 MG/DL (ref 70–99)
HBA1C MFR BLD: 6.3 % (ref 4.5–5.6)
HCO3 BLDA-SCNC: 22 MMOL/L (ref 22–28)
HCO3 BLDA-SCNC: 22 MMOL/L (ref 22–28)
HCO3 BLDA-SCNC: 23 MMOL/L (ref 22–28)
HCO3 BLDA-SCNC: 24 MMOL/L (ref 22–28)
HCO3 BLDA-SCNC: 24 MMOL/L (ref 22–28)
HCO3 BLDA-SCNC: 26 MMOL/L (ref 22–28)
HCO3 BLDA-SCNC: 27 MMOL/L (ref 22–28)
HCO3 BLDMV-SCNC: 23 MMOL/L
HCO3 BLDV-SCNC: 29.7 MMOL/L (ref 22–28)
HCT VFR BLD CALC: 26 % (ref 39–51)
HCT VFR BLD CALC: 26.6 % (ref 39–51)
HCT VFR BLD CALC: 27.3 % (ref 39–51)
HCT VFR BLD CALC: 28 % (ref 39–51)
HCT VFR BLD CALC: 28 % (ref 39–51)
HCT VFR BLD CALC: 29 % (ref 39–51)
HCT VFR BLD CALC: 29 % (ref 39–51)
HCT VFR BLD CALC: 36 % (ref 39–51)
HGB BLD-MCNC: 11.9 G/DL (ref 13–17)
HGB BLD-MCNC: 8.5 G/DL (ref 13–17)
HGB BLD-MCNC: 8.5 G/DL (ref 13–17)
HGB BLD-MCNC: 8.6 G/DL (ref 13–17)
HGB BLD-MCNC: 8.7 G/DL (ref 13–17)
HGB BLD-MCNC: 8.8 G/DL (ref 13–17)
HGB BLD-MCNC: 9.1 G/DL (ref 13–17)
HGB BLD-MCNC: 9.3 G/DL (ref 13–17)
HGB BLD-MCNC: 9.3 G/DL (ref 13–17)
HGB BLD-MCNC: 9.5 G/DL (ref 13–17)
HGB BLD-MCNC: 9.8 G/DL (ref 13–17)
HOROWITZ INDEX BLDA+IHG-RTO: 273 MM[HG] (ref 300–500)
HOROWITZ INDEX BLDA+IHG-RTO: 395 MM[HG] (ref 300–500)
INR PPP: 1.5
ISBT BLOOD TYPE: 6200
ISSUE DATE/TIME: NORMAL
MAGNESIUM SERPL-MCNC: 2.3 MG/DL (ref 1.7–2.4)
MCH RBC QN AUTO: 30.5 PG (ref 26–34)
MCHC RBC AUTO-ENTMCNC: 33.3 G/DL (ref 32–36.5)
MCV RBC AUTO: 91.6 FL (ref 78–100)
METHGB MFR BLDMV: 0 %
METHGB MFR BLDMV: 0.5 %
METHGB MFR BLDMV: 0.6 %
METHGB MFR BLDMV: 0.8 %
METHGB MFR BLDMV: 0.9 %
METHGB MFR BLDMV: 1.1 %
METHGB MFR BLDMV: 1.1 %
METHGB MFR BLDMV: 1.2 %
METHGB MFR BLDMV: 1.3 %
NRBC BLD MANUAL-RTO: 0 /100 WBC
OXYHGB MFR BLDA: 96.7 % (ref 94–98)
OXYHGB MFR BLDA: 96.8 % (ref 94–98)
OXYHGB MFR BLDA: 97.4 % (ref 94–98)
OXYHGB MFR BLDA: 97.5 % (ref 94–98)
OXYHGB MFR BLDA: 97.6 % (ref 94–98)
OXYHGB MFR BLDA: 98.1 % (ref 94–98)
OXYHGB MFR BLDA: 98.5 % (ref 94–98)
OXYHGB MFR BLDMV: 71.2 %
OXYHGB MFR BLDV: 83.2 % (ref 60–80)
PCO2 BLDA: 32 MM HG (ref 35–48)
PCO2 BLDA: 32 MM HG (ref 35–48)
PCO2 BLDA: 35 MM HG (ref 35–48)
PCO2 BLDA: 39 MM HG (ref 35–48)
PCO2 BLDA: 41 MM HG (ref 35–48)
PCO2 BLDA: 41 MM HG (ref 35–48)
PCO2 BLDA: 44 MM HG (ref 35–48)
PCO2 BLDMV: 44 MM HG
PCO2 BLDV: 44 MM HG (ref 41–54)
PH BLDA: 7.37 UNITS (ref 7.35–7.45)
PH BLDA: 7.38 UNITS (ref 7.35–7.45)
PH BLDA: 7.38 UNITS (ref 7.35–7.45)
PH BLDA: 7.4 UNITS (ref 7.35–7.45)
PH BLDA: 7.43 UNITS (ref 7.35–7.45)
PH BLDA: 7.44 UNITS (ref 7.35–7.45)
PH BLDA: 7.46 UNITS (ref 7.35–7.45)
PH BLDMV: 7.33 UNITS
PH BLDV: 7.43 UNITS (ref 7.35–7.45)
PLATELET # BLD AUTO: 110 K/MCL (ref 140–450)
PLATELET # BLD AUTO: 69 K/MCL (ref 140–450)
PO2 BLDA: 148 MM HG (ref 83–108)
PO2 BLDA: 164 MM HG (ref 83–108)
PO2 BLDA: 164 MM HG (ref 83–108)
PO2 BLDA: 252 MM HG (ref 83–108)
PO2 BLDA: 383 MM HG (ref 83–108)
PO2 BLDA: 407 MM HG (ref 83–108)
PO2 BLDA: 414 MM HG (ref 83–108)
PO2 BLDMV: 45 MM HG
PO2 BLDV: 42 MM HG (ref 35–42)
POTASSIUM BLD-SCNC: 3.8 MMOL/L (ref 3.4–5.1)
POTASSIUM BLD-SCNC: 3.9 MMOL/L (ref 3.4–5.1)
POTASSIUM BLD-SCNC: 4 MMOL/L (ref 3.4–5.1)
POTASSIUM BLD-SCNC: 4 MMOL/L (ref 3.4–5.1)
POTASSIUM BLD-SCNC: 4.1 MMOL/L (ref 3.4–5.1)
POTASSIUM BLD-SCNC: 4.2 MMOL/L (ref 3.4–5.1)
POTASSIUM BLD-SCNC: 4.4 MMOL/L (ref 3.4–5.1)
POTASSIUM BLD-SCNC: 4.7 MMOL/L (ref 3.4–5.1)
POTASSIUM BLD-SCNC: 4.7 MMOL/L (ref 3.4–5.1)
POTASSIUM SERPL-SCNC: 3.9 MMOL/L (ref 3.4–5.1)
PRODUCT CODE: NORMAL
PRODUCT DESCRIPTION: NORMAL
PRODUCT ID: NORMAL
PROTHROMBIN TIME: 15.5 SEC (ref 9.7–11.8)
RBC # BLD: 2.98 MIL/MCL (ref 4.5–5.9)
SAO2 % BLDA: 100 % (ref 95–99)
SAO2 % BLDA: 12 % (ref 15–23)
SAO2 % BLDA: 12 % (ref 15–23)
SAO2 % BLDA: 13 % (ref 15–23)
SAO2 % BLDA: 15 % (ref 15–23)
SAO2 % BLDA: 17 % (ref 15–23)
SAO2 % BLDA: 97 % (ref 95–99)
SAO2 % BLDMV: 9 %
SAO2 DF BLDMV: 73 %
SAO2 DF BLDV: 85 % (ref 60–80)
SODIUM BLD-SCNC: 132 MMOL/L (ref 135–145)
SODIUM BLD-SCNC: 137 MMOL/L (ref 135–145)
SODIUM BLD-SCNC: 139 MMOL/L (ref 135–145)
SODIUM BLD-SCNC: 141 MMOL/L (ref 135–145)
SODIUM SERPL-SCNC: 145 MMOL/L (ref 135–145)
UNIT BLOOD TYPE: NORMAL
UNIT NUMBER: NORMAL
WBC # BLD: 11.4 K/MCL (ref 4.2–11)

## 2023-09-20 PROCEDURE — 85610 PROTHROMBIN TIME: CPT | Performed by: THORACIC SURGERY (CARDIOTHORACIC VASCULAR SURGERY)

## 2023-09-20 PROCEDURE — 10006023 HB SUPPLY 272: Performed by: THORACIC SURGERY (CARDIOTHORACIC VASCULAR SURGERY)

## 2023-09-20 PROCEDURE — 10002803 HB RX 637: Performed by: NURSE PRACTITIONER

## 2023-09-20 PROCEDURE — C1751 CATH, INF, PER/CENT/MIDLINE: HCPCS | Performed by: THORACIC SURGERY (CARDIOTHORACIC VASCULAR SURGERY)

## 2023-09-20 PROCEDURE — A7041 WATER SEAL DRAIN CONTAINER: HCPCS | Performed by: THORACIC SURGERY (CARDIOTHORACIC VASCULAR SURGERY)

## 2023-09-20 PROCEDURE — P9045 ALBUMIN (HUMAN), 5%, 250 ML: HCPCS | Performed by: EMERGENCY MEDICINE

## 2023-09-20 PROCEDURE — 10002807 HB RX 258: Performed by: THORACIC SURGERY (CARDIOTHORACIC VASCULAR SURGERY)

## 2023-09-20 PROCEDURE — 82375 ASSAY CARBOXYHB QUANT: CPT

## 2023-09-20 PROCEDURE — 10002800 HB RX 250 W HCPCS: Performed by: HOSPITALIST

## 2023-09-20 PROCEDURE — 82947 ASSAY GLUCOSE BLOOD QUANT: CPT

## 2023-09-20 PROCEDURE — 13000104 HB CARDIO-THORACIC MAJOR COMPLEX CASE  S/U + 1ST 15 MIN: Performed by: THORACIC SURGERY (CARDIOTHORACIC VASCULAR SURGERY)

## 2023-09-20 PROCEDURE — 13000003 HB ANESTHESIA  GENERAL EA ADD MINUTE: Performed by: THORACIC SURGERY (CARDIOTHORACIC VASCULAR SURGERY)

## 2023-09-20 PROCEDURE — 85018 HEMOGLOBIN: CPT | Performed by: INTERNAL MEDICINE

## 2023-09-20 PROCEDURE — 84295 ASSAY OF SERUM SODIUM: CPT

## 2023-09-20 PROCEDURE — 10004180 HB COUNTER-TRANSPORT

## 2023-09-20 PROCEDURE — 10004651 HB RX, NO CHARGE ITEM: Performed by: NURSE PRACTITIONER

## 2023-09-20 PROCEDURE — P9035 PLATELET PHERES LEUKOREDUCED: HCPCS

## 2023-09-20 PROCEDURE — 83050 HGB METHEMOGLOBIN QUAN: CPT

## 2023-09-20 PROCEDURE — 10002800 HB RX 250 W HCPCS: Performed by: NURSE ANESTHETIST, CERTIFIED REGISTERED

## 2023-09-20 PROCEDURE — 82330 ASSAY OF CALCIUM: CPT

## 2023-09-20 PROCEDURE — 84132 ASSAY OF SERUM POTASSIUM: CPT

## 2023-09-20 PROCEDURE — 10002800 HB RX 250 W HCPCS: Performed by: PHYSICIAN ASSISTANT

## 2023-09-20 PROCEDURE — 85730 THROMBOPLASTIN TIME PARTIAL: CPT | Performed by: THORACIC SURGERY (CARDIOTHORACIC VASCULAR SURGERY)

## 2023-09-20 PROCEDURE — 10002801 HB RX 250 W/O HCPCS: Performed by: THORACIC SURGERY (CARDIOTHORACIC VASCULAR SURGERY)

## 2023-09-20 PROCEDURE — 86923 COMPATIBILITY TEST ELECTRIC: CPT

## 2023-09-20 PROCEDURE — 85027 COMPLETE CBC AUTOMATED: CPT | Performed by: THORACIC SURGERY (CARDIOTHORACIC VASCULAR SURGERY)

## 2023-09-20 PROCEDURE — 82962 GLUCOSE BLOOD TEST: CPT

## 2023-09-20 PROCEDURE — 85730 THROMBOPLASTIN TIME PARTIAL: CPT | Performed by: NURSE PRACTITIONER

## 2023-09-20 PROCEDURE — 83735 ASSAY OF MAGNESIUM: CPT | Performed by: NURSE PRACTITIONER

## 2023-09-20 PROCEDURE — 85049 AUTOMATED PLATELET COUNT: CPT | Performed by: THORACIC SURGERY (CARDIOTHORACIC VASCULAR SURGERY)

## 2023-09-20 PROCEDURE — C1768 GRAFT, VASCULAR: HCPCS | Performed by: THORACIC SURGERY (CARDIOTHORACIC VASCULAR SURGERY)

## 2023-09-20 PROCEDURE — C1729 CATH, DRAINAGE: HCPCS | Performed by: THORACIC SURGERY (CARDIOTHORACIC VASCULAR SURGERY)

## 2023-09-20 PROCEDURE — 94002 VENT MGMT INPAT INIT DAY: CPT

## 2023-09-20 PROCEDURE — 13000002 HB ANESTHESIA  GENERAL  S/U + 1ST 15 MIN: Performed by: THORACIC SURGERY (CARDIOTHORACIC VASCULAR SURGERY)

## 2023-09-20 PROCEDURE — 10000008 HB ROOM CHARGE ICU OR CCU

## 2023-09-20 PROCEDURE — 10002807 HB RX 258: Performed by: NURSE PRACTITIONER

## 2023-09-20 PROCEDURE — 5A1221Z PERFORMANCE OF CARDIAC OUTPUT, CONTINUOUS: ICD-10-PCS | Performed by: THORACIC SURGERY (CARDIOTHORACIC VASCULAR SURGERY)

## 2023-09-20 PROCEDURE — 13000105 HB CARDIO-THORACIC MAJOR COMPLEX CASE EA ADD MINUTE: Performed by: THORACIC SURGERY (CARDIOTHORACIC VASCULAR SURGERY)

## 2023-09-20 PROCEDURE — 021209W BYPASS CORONARY ARTERY, THREE ARTERIES FROM AORTA WITH AUTOLOGOUS VENOUS TISSUE, OPEN APPROACH: ICD-10-PCS | Performed by: THORACIC SURGERY (CARDIOTHORACIC VASCULAR SURGERY)

## 2023-09-20 PROCEDURE — 82805 BLOOD GASES W/O2 SATURATION: CPT

## 2023-09-20 PROCEDURE — 0PH000Z INSERTION OF RIGID PLATE INTERNAL FIXATION DEVICE INTO STERNUM, OPEN APPROACH: ICD-10-PCS | Performed by: THORACIC SURGERY (CARDIOTHORACIC VASCULAR SURGERY)

## 2023-09-20 PROCEDURE — 85018 HEMOGLOBIN: CPT

## 2023-09-20 PROCEDURE — 10002800 HB RX 250 W HCPCS: Performed by: THORACIC SURGERY (CARDIOTHORACIC VASCULAR SURGERY)

## 2023-09-20 PROCEDURE — 80048 BASIC METABOLIC PNL TOTAL CA: CPT | Performed by: NURSE PRACTITIONER

## 2023-09-20 PROCEDURE — 85384 FIBRINOGEN ACTIVITY: CPT | Performed by: THORACIC SURGERY (CARDIOTHORACIC VASCULAR SURGERY)

## 2023-09-20 PROCEDURE — 96372 THER/PROPH/DIAG INJ SC/IM: CPT | Performed by: HOSPITALIST

## 2023-09-20 PROCEDURE — 10002800 HB RX 250 W HCPCS: Performed by: EMERGENCY MEDICINE

## 2023-09-20 PROCEDURE — 06BQ4ZZ EXCISION OF LEFT SAPHENOUS VEIN, PERCUTANEOUS ENDOSCOPIC APPROACH: ICD-10-PCS | Performed by: THORACIC SURGERY (CARDIOTHORACIC VASCULAR SURGERY)

## 2023-09-20 PROCEDURE — 10002801 HB RX 250 W/O HCPCS: Performed by: NURSE ANESTHETIST, CERTIFIED REGISTERED

## 2023-09-20 PROCEDURE — 10002807 HB RX 258: Performed by: NURSE ANESTHETIST, CERTIFIED REGISTERED

## 2023-09-20 PROCEDURE — P9047 ALBUMIN (HUMAN), 25%, 50ML: HCPCS | Performed by: THORACIC SURGERY (CARDIOTHORACIC VASCULAR SURGERY)

## 2023-09-20 PROCEDURE — 10002800 HB RX 250 W HCPCS: Performed by: NURSE PRACTITIONER

## 2023-09-20 PROCEDURE — P9045 ALBUMIN (HUMAN), 5%, 250 ML: HCPCS | Performed by: NURSE PRACTITIONER

## 2023-09-20 PROCEDURE — 02100Z9 BYPASS CORONARY ARTERY, ONE ARTERY FROM LEFT INTERNAL MAMMARY, OPEN APPROACH: ICD-10-PCS | Performed by: THORACIC SURGERY (CARDIOTHORACIC VASCULAR SURGERY)

## 2023-09-20 PROCEDURE — C1713 ANCHOR/SCREW BN/BN,TIS/BN: HCPCS | Performed by: THORACIC SURGERY (CARDIOTHORACIC VASCULAR SURGERY)

## 2023-09-20 PROCEDURE — 10006027 HB SUPPLY 278: Performed by: THORACIC SURGERY (CARDIOTHORACIC VASCULAR SURGERY)

## 2023-09-20 DEVICE — SCREW BN 2.3MM 12MM SELF DRILL LOCK AX STAB NS LF: Type: IMPLANTABLE DEVICE | Site: STERNUM | Status: FUNCTIONAL

## 2023-09-20 DEVICE — SPONGE ABS THK10MM 12.5X8CM PORCINE GELTN STRL DISP SURGFM: Type: IMPLANTABLE DEVICE | Site: STERNUM | Status: FUNCTIONAL

## 2023-09-20 DEVICE — HORIZON TI SMALL RED  24 CLIPS/POUCH
Type: IMPLANTABLE DEVICE | Site: CHEST | Status: FUNCTIONAL
Brand: WECK

## 2023-09-20 DEVICE — FLOSEAL HEMOSTATIC MATRIX, 10ML
Type: IMPLANTABLE DEVICE | Site: CHEST | Status: FUNCTIONAL
Brand: FLOSEAL HEMOSTATIC MATRIX

## 2023-09-20 DEVICE — HORIZON TI LG 6 CLIPS/CART
Type: IMPLANTABLE DEVICE | Site: LEG UPPER | Status: FUNCTIONAL
Brand: WECK

## 2023-09-20 DEVICE — CLIP INTERNAL MED CHEVRON 24 CRTDG LIGATE TRIANGULATE XSECT: Type: IMPLANTABLE DEVICE | Site: CHEST | Status: FUNCTIONAL

## 2023-09-20 DEVICE — PLATE BN STRNL 8 HOLE X NS LF: Type: IMPLANTABLE DEVICE | Site: STERNUM | Status: FUNCTIONAL

## 2023-09-20 DEVICE — IMPLANTABLE DEVICE: Type: IMPLANTABLE DEVICE | Site: STERNUM | Status: FUNCTIONAL

## 2023-09-20 DEVICE — SCREW BN 2.3MM 10MM SELF DRILL LOCK AX STAB NS LF: Type: IMPLANTABLE DEVICE | Site: STERNUM | Status: FUNCTIONAL

## 2023-09-20 DEVICE — BARD® PTFE FELT PLEDGETS, (OVAL), 4.8 MM X 6 MM
Type: IMPLANTABLE DEVICE | Site: CHEST | Status: FUNCTIONAL
Brand: BARD® PTFE FELT PLEDGETS

## 2023-09-20 DEVICE — HORIZON TI SMALL 24 CLIPS/POUCH
Type: IMPLANTABLE DEVICE | Site: CHEST | Status: FUNCTIONAL
Brand: WECK

## 2023-09-20 DEVICE — PLATE BN STRNL LOWPRFL L: Type: IMPLANTABLE DEVICE | Site: STERNUM | Status: FUNCTIONAL

## 2023-09-20 RX ORDER — DEXTROSE MONOHYDRATE 25 G/50ML
25 INJECTION, SOLUTION INTRAVENOUS PRN
Status: DISCONTINUED | OUTPATIENT
Start: 2023-09-20 | End: 2023-09-25 | Stop reason: HOSPADM

## 2023-09-20 RX ORDER — DEXTROSE AND SODIUM CHLORIDE 5; .45 G/100ML; G/100ML
INJECTION, SOLUTION INTRAVENOUS CONTINUOUS
Status: DISCONTINUED | OUTPATIENT
Start: 2023-09-20 | End: 2023-09-21

## 2023-09-20 RX ORDER — HEPARIN SODIUM 1000 [USP'U]/ML
INJECTION, SOLUTION INTRAVENOUS; SUBCUTANEOUS PRN
Status: DISCONTINUED | OUTPATIENT
Start: 2023-09-20 | End: 2023-09-20

## 2023-09-20 RX ORDER — NOREPINEPHRINE BITARTRATE/D5W 8 MG/250ML
PLASTIC BAG, INJECTION (ML) INTRAVENOUS CONTINUOUS PRN
Status: DISCONTINUED | OUTPATIENT
Start: 2023-09-20 | End: 2023-09-20

## 2023-09-20 RX ORDER — NICOTINE POLACRILEX 4 MG
30 LOZENGE BUCCAL PRN
Status: DISCONTINUED | OUTPATIENT
Start: 2023-09-20 | End: 2023-09-25 | Stop reason: HOSPADM

## 2023-09-20 RX ORDER — DOBUTAMINE HYDROCHLORIDE 200 MG/100ML
0-10 INJECTION INTRAVENOUS CONTINUOUS
Status: DISCONTINUED | OUTPATIENT
Start: 2023-09-20 | End: 2023-09-22

## 2023-09-20 RX ORDER — ALBUMIN, HUMAN INJ 5% 5 %
12.5 SOLUTION INTRAVENOUS ONCE
Status: COMPLETED | OUTPATIENT
Start: 2023-09-20 | End: 2023-09-20

## 2023-09-20 RX ORDER — DEXTROSE AND SODIUM CHLORIDE 5; .45 G/100ML; G/100ML
INJECTION, SOLUTION INTRAVENOUS CONTINUOUS
Status: DISCONTINUED | OUTPATIENT
Start: 2023-09-20 | End: 2023-09-20

## 2023-09-20 RX ORDER — HEPARIN SOD,PORCINE/0.9 % NACL 30K/1000ML
INTRAVENOUS SOLUTION INTRAVENOUS CONTINUOUS PRN
Status: COMPLETED | OUTPATIENT
Start: 2023-09-20 | End: 2023-09-20

## 2023-09-20 RX ORDER — DEXTROSE MONOHYDRATE 25 G/50ML
25 INJECTION, SOLUTION INTRAVENOUS PRN
Status: DISCONTINUED | OUTPATIENT
Start: 2023-09-20 | End: 2023-09-20

## 2023-09-20 RX ORDER — SODIUM CHLORIDE 9 MG/ML
INJECTION, SOLUTION INTRAVENOUS CONTINUOUS
Status: DISCONTINUED | OUTPATIENT
Start: 2023-09-20 | End: 2023-09-21

## 2023-09-20 RX ORDER — AMOXICILLIN 250 MG
2 CAPSULE ORAL DAILY
Status: DISCONTINUED | OUTPATIENT
Start: 2023-09-21 | End: 2023-09-25 | Stop reason: HOSPADM

## 2023-09-20 RX ORDER — ONDANSETRON 4 MG/1
4 TABLET, ORALLY DISINTEGRATING ORAL EVERY 12 HOURS PRN
Status: DISCONTINUED | OUTPATIENT
Start: 2023-09-20 | End: 2023-09-25 | Stop reason: HOSPADM

## 2023-09-20 RX ORDER — DEXMEDETOMIDINE HYDROCHLORIDE 4 UG/ML
INJECTION, SOLUTION INTRAVENOUS CONTINUOUS PRN
Status: DISCONTINUED | OUTPATIENT
Start: 2023-09-20 | End: 2023-09-20

## 2023-09-20 RX ORDER — NICOTINE POLACRILEX 4 MG
15 LOZENGE BUCCAL PRN
Status: DISCONTINUED | OUTPATIENT
Start: 2023-09-20 | End: 2023-09-25 | Stop reason: HOSPADM

## 2023-09-20 RX ORDER — METOCLOPRAMIDE HYDROCHLORIDE 5 MG/ML
5 INJECTION INTRAMUSCULAR; INTRAVENOUS EVERY 6 HOURS SCHEDULED
Status: DISCONTINUED | OUTPATIENT
Start: 2023-09-20 | End: 2023-09-20

## 2023-09-20 RX ORDER — SODIUM CHLORIDE, SODIUM GLUCONATE, SODIUM ACETATE, POTASSIUM CHLORIDE AND MAGNESIUM CHLORIDE 526; 502; 368; 37; 30 MG/100ML; MG/100ML; MG/100ML; MG/100ML; MG/100ML
INJECTION, SOLUTION INTRAVENOUS CONTINUOUS PRN
Status: DISCONTINUED | OUTPATIENT
Start: 2023-09-20 | End: 2023-09-20

## 2023-09-20 RX ORDER — ASPIRIN 300 MG/1
300 SUPPOSITORY RECTAL ONCE
Status: COMPLETED | OUTPATIENT
Start: 2023-09-20 | End: 2023-09-20

## 2023-09-20 RX ORDER — SODIUM CHLORIDE, SODIUM LACTATE, POTASSIUM CHLORIDE, CALCIUM CHLORIDE 600; 310; 30; 20 MG/100ML; MG/100ML; MG/100ML; MG/100ML
INJECTION, SOLUTION INTRAVENOUS CONTINUOUS
Status: DISCONTINUED | OUTPATIENT
Start: 2023-09-20 | End: 2023-09-20

## 2023-09-20 RX ORDER — ACETAMINOPHEN 500 MG
1000 TABLET ORAL EVERY 8 HOURS SCHEDULED
Status: COMPLETED | OUTPATIENT
Start: 2023-09-20 | End: 2023-09-25

## 2023-09-20 RX ORDER — PROTAMINE SULFATE 10 MG/ML
INJECTION, SOLUTION INTRAVENOUS PRN
Status: DISCONTINUED | OUTPATIENT
Start: 2023-09-20 | End: 2023-09-20

## 2023-09-20 RX ORDER — TRANEXAMIC ACID 10 MG/ML
INJECTION, SOLUTION INTRAVENOUS PRN
Status: DISCONTINUED | OUTPATIENT
Start: 2023-09-20 | End: 2023-09-20

## 2023-09-20 RX ORDER — DEXTROSE MONOHYDRATE 25 G/50ML
12.5 INJECTION, SOLUTION INTRAVENOUS PRN
Status: DISCONTINUED | OUTPATIENT
Start: 2023-09-20 | End: 2023-09-25 | Stop reason: HOSPADM

## 2023-09-20 RX ORDER — CALCIUM CHLORIDE 100 MG/ML
INJECTION INTRAVENOUS; INTRAVENTRICULAR PRN
Status: DISCONTINUED | OUTPATIENT
Start: 2023-09-20 | End: 2023-09-20

## 2023-09-20 RX ORDER — ALBUMIN (HUMAN) 12.5 G/50ML
SOLUTION INTRAVENOUS PRN
Status: DISCONTINUED | OUTPATIENT
Start: 2023-09-20 | End: 2023-09-20

## 2023-09-20 RX ORDER — CHLORHEXIDINE GLUCONATE ORAL RINSE 1.2 MG/ML
15 SOLUTION DENTAL PRN
Status: DISCONTINUED | OUTPATIENT
Start: 2023-09-20 | End: 2023-09-21

## 2023-09-20 RX ORDER — POTASSIUM CHLORIDE 1.5 G/1.58G
20 POWDER, FOR SOLUTION ORAL PRN
Status: DISCONTINUED | OUTPATIENT
Start: 2023-09-20 | End: 2023-09-22

## 2023-09-20 RX ORDER — GABAPENTIN 300 MG/1
600 CAPSULE ORAL NIGHTLY
Status: DISCONTINUED | OUTPATIENT
Start: 2023-09-20 | End: 2023-09-21

## 2023-09-20 RX ORDER — ALBUMIN, HUMAN INJ 5% 5 %
12.5 SOLUTION INTRAVENOUS PRN
Status: COMPLETED | OUTPATIENT
Start: 2023-09-20 | End: 2023-09-20

## 2023-09-20 RX ORDER — ACETAMINOPHEN 325 MG/1
650 TABLET ORAL EVERY 4 HOURS PRN
Status: DISCONTINUED | OUTPATIENT
Start: 2023-09-25 | End: 2023-09-25 | Stop reason: HOSPADM

## 2023-09-20 RX ORDER — PAPAVERINE HYDROCHLORIDE 30 MG/ML
INJECTION INTRAMUSCULAR; INTRAVENOUS PRN
Status: DISCONTINUED | OUTPATIENT
Start: 2023-09-20 | End: 2023-09-20 | Stop reason: HOSPADM

## 2023-09-20 RX ORDER — SODIUM CHLORIDE 9 MG/ML
INJECTION, SOLUTION INTRAVENOUS CONTINUOUS PRN
Status: DISCONTINUED | OUTPATIENT
Start: 2023-09-20 | End: 2023-09-20

## 2023-09-20 RX ORDER — POTASSIUM CHLORIDE 29.8 MG/ML
40 INJECTION INTRAVENOUS PRN
Status: DISCONTINUED | OUTPATIENT
Start: 2023-09-20 | End: 2023-09-22

## 2023-09-20 RX ORDER — MAGNESIUM SULFATE HEPTAHYDRATE 500 MG/ML
INJECTION, SOLUTION INTRAMUSCULAR; INTRAVENOUS PRN
Status: DISCONTINUED | OUTPATIENT
Start: 2023-09-20 | End: 2023-09-20

## 2023-09-20 RX ORDER — DEXMEDETOMIDINE HYDROCHLORIDE 4 UG/ML
0-1.5 INJECTION, SOLUTION INTRAVENOUS CONTINUOUS
Status: DISCONTINUED | OUTPATIENT
Start: 2023-09-20 | End: 2023-09-21

## 2023-09-20 RX ORDER — ASPIRIN 81 MG/1
81 TABLET ORAL DAILY
Status: DISCONTINUED | OUTPATIENT
Start: 2023-09-21 | End: 2023-09-25 | Stop reason: HOSPADM

## 2023-09-20 RX ORDER — AMIODARONE HYDROCHLORIDE 50 MG/ML
INJECTION, SOLUTION INTRAVENOUS PRN
Status: DISCONTINUED | OUTPATIENT
Start: 2023-09-20 | End: 2023-09-20

## 2023-09-20 RX ORDER — ALBUTEROL SULFATE 2.5 MG/3ML
2.5 SOLUTION RESPIRATORY (INHALATION)
Status: DISCONTINUED | OUTPATIENT
Start: 2023-09-20 | End: 2023-09-25 | Stop reason: HOSPADM

## 2023-09-20 RX ORDER — ROCURONIUM BROMIDE 10 MG/ML
INJECTION, SOLUTION INTRAVENOUS PRN
Status: DISCONTINUED | OUTPATIENT
Start: 2023-09-20 | End: 2023-09-20

## 2023-09-20 RX ORDER — DEXTROSE MONOHYDRATE, SODIUM CHLORIDE, AND POTASSIUM CHLORIDE 50; 2.98; 4.5 G/1000ML; G/1000ML; G/1000ML
INJECTION, SOLUTION INTRAVENOUS CONTINUOUS
Status: DISCONTINUED | OUTPATIENT
Start: 2023-09-20 | End: 2023-09-21

## 2023-09-20 RX ORDER — CHLORHEXIDINE GLUCONATE ORAL RINSE 1.2 MG/ML
15 SOLUTION DENTAL EVERY 12 HOURS SCHEDULED
Status: DISCONTINUED | OUTPATIENT
Start: 2023-09-20 | End: 2023-09-20

## 2023-09-20 RX ORDER — ONDANSETRON 2 MG/ML
4 INJECTION INTRAMUSCULAR; INTRAVENOUS EVERY 12 HOURS PRN
Status: DISCONTINUED | OUTPATIENT
Start: 2023-09-20 | End: 2023-09-25 | Stop reason: HOSPADM

## 2023-09-20 RX ORDER — NITROGLYCERIN 20 MG/100ML
0-150 INJECTION INTRAVENOUS CONTINUOUS
Status: DISCONTINUED | OUTPATIENT
Start: 2023-09-20 | End: 2023-09-21

## 2023-09-20 RX ORDER — POTASSIUM CHLORIDE 29.8 MG/ML
20 INJECTION INTRAVENOUS PRN
Status: DISCONTINUED | OUTPATIENT
Start: 2023-09-20 | End: 2023-09-22

## 2023-09-20 RX ORDER — TRANEXAMIC ACID 100 MG/ML
INJECTION, SOLUTION INTRAVENOUS PRN
Status: DISCONTINUED | OUTPATIENT
Start: 2023-09-20 | End: 2023-09-20

## 2023-09-20 RX ORDER — POLYETHYLENE GLYCOL 3350 17 G/17G
17 POWDER, FOR SOLUTION ORAL 2 TIMES DAILY
Status: DISCONTINUED | OUTPATIENT
Start: 2023-09-24 | End: 2023-09-21

## 2023-09-20 RX ORDER — HYDROCODONE BITARTRATE AND ACETAMINOPHEN 5; 325 MG/1; MG/1
1 TABLET ORAL EVERY 8 HOURS PRN
Status: DISCONTINUED | OUTPATIENT
Start: 2023-09-20 | End: 2023-09-25 | Stop reason: HOSPADM

## 2023-09-20 RX ORDER — NOREPINEPHRINE BITARTRATE/D5W 8 MG/250ML
0-10 PLASTIC BAG, INJECTION (ML) INTRAVENOUS CONTINUOUS
Status: DISCONTINUED | OUTPATIENT
Start: 2023-09-20 | End: 2023-09-22

## 2023-09-20 RX ORDER — MIDAZOLAM HYDROCHLORIDE 1 MG/ML
INJECTION, SOLUTION INTRAMUSCULAR; INTRAVENOUS PRN
Status: DISCONTINUED | OUTPATIENT
Start: 2023-09-20 | End: 2023-09-20

## 2023-09-20 RX ORDER — LORAZEPAM 2 MG/ML
1 INJECTION INTRAMUSCULAR EVERY 4 HOURS PRN
Status: DISCONTINUED | OUTPATIENT
Start: 2023-09-20 | End: 2023-09-21

## 2023-09-20 RX ORDER — POTASSIUM CHLORIDE 1.5 G/1.58G
40 POWDER, FOR SOLUTION ORAL PRN
Status: DISCONTINUED | OUTPATIENT
Start: 2023-09-20 | End: 2023-09-22

## 2023-09-20 RX ORDER — AMIODARONE HYDROCHLORIDE 200 MG/1
200 TABLET ORAL 2 TIMES DAILY WITH MEALS
Status: DISCONTINUED | OUTPATIENT
Start: 2023-09-21 | End: 2023-09-25 | Stop reason: HOSPADM

## 2023-09-20 RX ORDER — BISACODYL 10 MG
10 SUPPOSITORY, RECTAL RECTAL ONCE
Status: DISCONTINUED | OUTPATIENT
Start: 2023-09-22 | End: 2023-09-25 | Stop reason: HOSPADM

## 2023-09-20 RX ORDER — DOBUTAMINE HYDROCHLORIDE 200 MG/100ML
INJECTION INTRAVENOUS CONTINUOUS PRN
Status: DISCONTINUED | OUTPATIENT
Start: 2023-09-20 | End: 2023-09-20

## 2023-09-20 RX ADMIN — TRANEXAMIC ACID 1000 MG: 10 INJECTION, SOLUTION INTRAVENOUS at 07:34

## 2023-09-20 RX ADMIN — NOREPINEPHRINE BITARTRATE 3 MCG/MIN: 8 INJECTION, SOLUTION INTRAVENOUS at 07:30

## 2023-09-20 RX ADMIN — MAGNESIUM SULFATE HEPTAHYDRATE 2 G: 500 INJECTION, SOLUTION INTRAMUSCULAR; INTRAVENOUS at 10:42

## 2023-09-20 RX ADMIN — MORPHINE SULFATE 2 MG: 2 INJECTION, SOLUTION INTRAMUSCULAR; INTRAVENOUS at 21:47

## 2023-09-20 RX ADMIN — FENTANYL CITRATE 100 MCG: 50 INJECTION, SOLUTION INTRAMUSCULAR; INTRAVENOUS at 12:50

## 2023-09-20 RX ADMIN — FENTANYL CITRATE 100 MCG: 50 INJECTION, SOLUTION INTRAMUSCULAR; INTRAVENOUS at 07:54

## 2023-09-20 RX ADMIN — ACETAMINOPHEN 1000 MG: 500 TABLET ORAL at 14:54

## 2023-09-20 RX ADMIN — INSULIN HUMAN 5 UNITS/HR: 1 INJECTION, SOLUTION INTRAVENOUS at 07:31

## 2023-09-20 RX ADMIN — FENTANYL CITRATE 50 MCG: 50 INJECTION, SOLUTION INTRAMUSCULAR; INTRAVENOUS at 10:46

## 2023-09-20 RX ADMIN — MIDAZOLAM HYDROCHLORIDE 7 MG: 1 INJECTION, SOLUTION INTRAMUSCULAR; INTRAVENOUS at 07:22

## 2023-09-20 RX ADMIN — HYDROMORPHONE HYDROCHLORIDE 1 MG: 1 INJECTION, SOLUTION INTRAMUSCULAR; INTRAVENOUS; SUBCUTANEOUS at 23:04

## 2023-09-20 RX ADMIN — MIDAZOLAM HYDROCHLORIDE 1 MG: 1 INJECTION, SOLUTION INTRAMUSCULAR; INTRAVENOUS at 08:21

## 2023-09-20 RX ADMIN — TRANEXAMIC ACID 125 MG: 1 INJECTION, SOLUTION INTRAVENOUS at 06:55

## 2023-09-20 RX ADMIN — GABAPENTIN 600 MG: 300 CAPSULE ORAL at 04:51

## 2023-09-20 RX ADMIN — ROCURONIUM BROMIDE 50 MG: 10 INJECTION INTRAVENOUS at 07:23

## 2023-09-20 RX ADMIN — INSULIN LISPRO 2 UNITS: 100 INJECTION, SOLUTION INTRAVENOUS; SUBCUTANEOUS at 06:13

## 2023-09-20 RX ADMIN — PROTAMINE SULFATE 50 MG: 10 INJECTION, SOLUTION INTRAVENOUS at 12:30

## 2023-09-20 RX ADMIN — ROCURONIUM BROMIDE 50 MG: 10 INJECTION INTRAVENOUS at 09:31

## 2023-09-20 RX ADMIN — MIDAZOLAM HYDROCHLORIDE 4 MG: 1 INJECTION, SOLUTION INTRAMUSCULAR; INTRAVENOUS at 10:37

## 2023-09-20 RX ADMIN — AMIODARONE HYDROCHLORIDE 1 MG/MIN: 1.8 INJECTION, SOLUTION INTRAVENOUS at 16:48

## 2023-09-20 RX ADMIN — CHLORHEXIDINE GLUCONATE 15 ML: 1.2 RINSE ORAL at 14:53

## 2023-09-20 RX ADMIN — PROTAMINE SULFATE 180 MG: 10 INJECTION, SOLUTION INTRAVENOUS at 11:15

## 2023-09-20 RX ADMIN — SODIUM BICARBONATE 50 MEQ: 84 INJECTION, SOLUTION INTRAVENOUS at 06:55

## 2023-09-20 RX ADMIN — DESMOPRESSIN ACETATE 19 MCG: 4 INJECTION INTRAVENOUS at 12:20

## 2023-09-20 RX ADMIN — ROCURONIUM BROMIDE 50 MG: 10 INJECTION INTRAVENOUS at 08:31

## 2023-09-20 RX ADMIN — SODIUM CHLORIDE: 9 INJECTION, SOLUTION INTRAVENOUS at 13:00

## 2023-09-20 RX ADMIN — POTASSIUM CHLORIDE 20 MEQ: 29.8 INJECTION, SOLUTION INTRAVENOUS at 14:20

## 2023-09-20 RX ADMIN — MIDAZOLAM HYDROCHLORIDE 5 MG: 1 INJECTION, SOLUTION INTRAMUSCULAR; INTRAVENOUS at 09:31

## 2023-09-20 RX ADMIN — SODIUM CHLORIDE, SODIUM GLUCONATE, SODIUM ACETATE, POTASSIUM CHLORIDE AND MAGNESIUM CHLORIDE: 526; 502; 368; 37; 30 INJECTION, SOLUTION INTRAVENOUS at 06:55

## 2023-09-20 RX ADMIN — CEFAZOLIN SODIUM 2000 MG: 300 INJECTION, POWDER, LYOPHILIZED, FOR SOLUTION INTRAVENOUS at 10:45

## 2023-09-20 RX ADMIN — FENTANYL CITRATE 250 MCG: 50 INJECTION, SOLUTION INTRAMUSCULAR; INTRAVENOUS at 09:31

## 2023-09-20 RX ADMIN — LIDOCAINE HYDROCHLORIDE 100 MG: 20 INJECTION, SOLUTION INTRAVENOUS at 10:52

## 2023-09-20 RX ADMIN — POTASSIUM CHLORIDE, DEXTROSE MONOHYDRATE AND SODIUM CHLORIDE: 300; 5; 450 INJECTION, SOLUTION INTRAVENOUS at 13:00

## 2023-09-20 RX ADMIN — FENTANYL CITRATE 250 MCG: 50 INJECTION, SOLUTION INTRAMUSCULAR; INTRAVENOUS at 07:22

## 2023-09-20 RX ADMIN — FENTANYL CITRATE 50 MCG: 50 INJECTION, SOLUTION INTRAMUSCULAR; INTRAVENOUS at 08:06

## 2023-09-20 RX ADMIN — HEPARIN SODIUM 10000 UNITS: 1000 INJECTION, SOLUTION INTRAVENOUS; SUBCUTANEOUS at 09:16

## 2023-09-20 RX ADMIN — VANCOMYCIN HYDROCHLORIDE 1250 MG: 10 INJECTION, POWDER, LYOPHILIZED, FOR SOLUTION INTRAVENOUS at 07:38

## 2023-09-20 RX ADMIN — ACETAMINOPHEN 1000 MG: 500 TABLET ORAL at 21:04

## 2023-09-20 RX ADMIN — SODIUM CHLORIDE 2 MG/KG/HR: 0.9 INJECTION, SOLUTION INTRAVENOUS at 07:59

## 2023-09-20 RX ADMIN — PHENYLEPHRINE HYDROCHLORIDE 10 MCG/MIN: 10 INJECTION INTRAVENOUS at 07:34

## 2023-09-20 RX ADMIN — DEXMEDETOMIDINE HYDROCHLORIDE 0.4 MCG/KG/HR: 400 INJECTION INTRAVENOUS at 12:45

## 2023-09-20 RX ADMIN — MIDAZOLAM HYDROCHLORIDE 1.5 MG: 1 INJECTION, SOLUTION INTRAMUSCULAR; INTRAVENOUS at 06:10

## 2023-09-20 RX ADMIN — VANCOMYCIN HYDROCHLORIDE 1000 MG: 1 INJECTION, POWDER, LYOPHILIZED, FOR SOLUTION INTRAVENOUS at 21:06

## 2023-09-20 RX ADMIN — HYDROMORPHONE HYDROCHLORIDE 1 MG: 1 INJECTION, SOLUTION INTRAMUSCULAR; INTRAVENOUS; SUBCUTANEOUS at 14:19

## 2023-09-20 RX ADMIN — FENTANYL CITRATE 50 MCG: 50 INJECTION, SOLUTION INTRAMUSCULAR; INTRAVENOUS at 07:47

## 2023-09-20 RX ADMIN — MIDAZOLAM HYDROCHLORIDE 1.5 MG: 1 INJECTION, SOLUTION INTRAMUSCULAR; INTRAVENOUS at 07:07

## 2023-09-20 RX ADMIN — ALBUMIN HUMAN 12.5 G: 0.05 INJECTION, SOLUTION INTRAVENOUS at 21:06

## 2023-09-20 RX ADMIN — KETAMINE HYDROCHLORIDE 0.2 MG/KG/HR: 50 INJECTION INTRAMUSCULAR; INTRAVENOUS at 08:57

## 2023-09-20 RX ADMIN — GABAPENTIN 600 MG: 300 CAPSULE ORAL at 21:04

## 2023-09-20 RX ADMIN — ROCURONIUM BROMIDE 50 MG: 10 INJECTION INTRAVENOUS at 10:46

## 2023-09-20 RX ADMIN — CEFAZOLIN SODIUM 2000 MG: 300 INJECTION, POWDER, LYOPHILIZED, FOR SOLUTION INTRAVENOUS at 21:04

## 2023-09-20 RX ADMIN — HEPARIN SODIUM 21000 UNITS: 1000 INJECTION, SOLUTION INTRAVENOUS; SUBCUTANEOUS at 08:37

## 2023-09-20 RX ADMIN — VANCOMYCIN HYDROCHLORIDE 1000 MG: 1 INJECTION, POWDER, LYOPHILIZED, FOR SOLUTION INTRAVENOUS at 15:00

## 2023-09-20 RX ADMIN — ASPIRIN 300 MG: 300 SUPPOSITORY RECTAL at 19:19

## 2023-09-20 RX ADMIN — ALBUMIN HUMAN 12.5 G: 0.05 INJECTION, SOLUTION INTRAVENOUS at 15:52

## 2023-09-20 RX ADMIN — HEPARIN SODIUM 10000 UNITS: 1000 INJECTION, SOLUTION INTRAVENOUS; SUBCUTANEOUS at 06:55

## 2023-09-20 RX ADMIN — SODIUM CHLORIDE, SODIUM GLUCONATE, SODIUM ACETATE, POTASSIUM CHLORIDE AND MAGNESIUM CHLORIDE: 526; 502; 368; 37; 30 INJECTION, SOLUTION INTRAVENOUS at 06:07

## 2023-09-20 RX ADMIN — CEFAZOLIN SODIUM 2000 MG: 300 INJECTION, POWDER, LYOPHILIZED, FOR SOLUTION INTRAVENOUS at 17:22

## 2023-09-20 RX ADMIN — ALBUMIN HUMAN 100 ML: 0.25 SOLUTION INTRAVENOUS at 06:55

## 2023-09-20 RX ADMIN — CALCIUM CHLORIDE 1 G: 100 INJECTION INTRAVENOUS; INTRAVENTRICULAR at 10:54

## 2023-09-20 RX ADMIN — AMIODARONE HYDROCHLORIDE 150 MG: 50 INJECTION, SOLUTION INTRAVENOUS at 10:56

## 2023-09-20 RX ADMIN — METOPROLOL TARTRATE 25 MG: 25 TABLET, FILM COATED ORAL at 04:51

## 2023-09-20 RX ADMIN — CEFAZOLIN SODIUM 2000 MG: 300 INJECTION, POWDER, LYOPHILIZED, FOR SOLUTION INTRAVENOUS at 07:45

## 2023-09-20 RX ADMIN — HEPARIN SODIUM 5000 UNITS: 1000 INJECTION, SOLUTION INTRAVENOUS; SUBCUTANEOUS at 10:15

## 2023-09-20 RX ADMIN — SODIUM CHLORIDE, SODIUM GLUCONATE, SODIUM ACETATE, POTASSIUM CHLORIDE AND MAGNESIUM CHLORIDE: 526; 502; 368; 37; 30 INJECTION, SOLUTION INTRAVENOUS at 12:21

## 2023-09-20 RX ADMIN — DOBUTAMINE IN DEXTROSE 5 MCG/KG/MIN: 200 INJECTION, SOLUTION INTRAVENOUS at 10:56

## 2023-09-20 RX ADMIN — ALBUMIN HUMAN 12.5 G: 0.05 INJECTION, SOLUTION INTRAVENOUS at 17:34

## 2023-09-20 RX ADMIN — AMIODARONE HYDROCHLORIDE 1 MG/MIN: 1.8 INJECTION, SOLUTION INTRAVENOUS at 11:07

## 2023-09-20 ASSESSMENT — PAIN SCALES - GENERAL
PAINLEVEL_OUTOF10: 0
PAINLEVEL_OUTOF10: 5
PAINLEVEL_OUTOF10: 0
PAINLEVEL_OUTOF10: 8
PAINLEVEL_OUTOF10: 2

## 2023-09-20 ASSESSMENT — PAIN SCALES - BEHAVIORAL PAIN SCALE (BPS)
BPS_SCORE: 3
BPS_SCORE: 4
BPS_SCORE: 3

## 2023-09-20 ASSESSMENT — PULMONARY FUNCTION TESTS: FEV1/FVC: UNABLE TO OBTAIN, OR GREATER THAN 70%

## 2023-09-21 LAB
ANION GAP SERPL CALC-SCNC: 7 MMOL/L (ref 7–19)
BASE EXCESS / DEFICIT, MIXED VENOUS - RESPIRATORY: -4 MMOL/L
BDY SITE: ABNORMAL
BODY TEMPERATURE: 37 DEGREES C
BUN SERPL-MCNC: 15 MG/DL (ref 6–20)
BUN/CREAT SERPL: 14 (ref 7–25)
CA-I BLD-SCNC: 1.12 MMOL/L (ref 1.15–1.29)
CALCIUM SERPL-MCNC: 7.6 MG/DL (ref 8.4–10.2)
CHLORIDE BLD-SCNC: 106 MMOL/L (ref 97–110)
CHLORIDE SERPL-SCNC: 114 MMOL/L (ref 97–110)
CO2 SERPL-SCNC: 24 MMOL/L (ref 21–32)
COHGB MFR BLDMV: 2 %
CONDITION: ABNORMAL
CONDITION: ABNORMAL
CREAT SERPL-MCNC: 1.04 MG/DL (ref 0.67–1.17)
DEPRECATED RDW RBC: 43.5 FL (ref 39–50)
EGFRCR SERPLBLD CKD-EPI 2021: 82 ML/MIN/{1.73_M2}
ERYTHROCYTE [DISTWIDTH] IN BLOOD: 12.8 % (ref 11–15)
FASTING DURATION TIME PATIENT: ABNORMAL H
FIBRINOGEN PPP-MCNC: 341 MG/DL (ref 190–425)
FIO2 ON VENT: 21 %
GLUCOSE BLD-MCNC: 111 MG/DL (ref 65–99)
GLUCOSE BLDC GLUCOMTR-MCNC: 107 MG/DL (ref 70–99)
GLUCOSE BLDC GLUCOMTR-MCNC: 115 MG/DL (ref 70–99)
GLUCOSE BLDC GLUCOMTR-MCNC: 129 MG/DL (ref 70–99)
GLUCOSE BLDC GLUCOMTR-MCNC: 130 MG/DL (ref 70–99)
GLUCOSE BLDC GLUCOMTR-MCNC: 133 MG/DL (ref 70–99)
GLUCOSE BLDC GLUCOMTR-MCNC: 139 MG/DL (ref 70–99)
GLUCOSE BLDC GLUCOMTR-MCNC: 150 MG/DL (ref 70–99)
GLUCOSE BLDC GLUCOMTR-MCNC: 227 MG/DL (ref 70–99)
GLUCOSE BLDC GLUCOMTR-MCNC: 328 MG/DL (ref 70–99)
GLUCOSE BLDC GLUCOMTR-MCNC: 329 MG/DL (ref 70–99)
GLUCOSE BLDC GLUCOMTR-MCNC: 332 MG/DL (ref 70–99)
GLUCOSE SERPL-MCNC: 129 MG/DL (ref 70–99)
HCO3 BLDMV-SCNC: 22 MMOL/L
HCT VFR BLD CALC: 26 % (ref 39–51)
HCT VFR BLD CALC: 28.1 % (ref 39–51)
HGB BLD-MCNC: 8.8 G/DL (ref 13–17)
HGB BLD-MCNC: 9.1 G/DL (ref 13–17)
INR PPP: 1.2
MAGNESIUM SERPL-MCNC: 2.4 MG/DL (ref 1.7–2.4)
MCH RBC QN AUTO: 30 PG (ref 26–34)
MCHC RBC AUTO-ENTMCNC: 32.4 G/DL (ref 32–36.5)
MCV RBC AUTO: 92.7 FL (ref 78–100)
METHGB MFR BLDMV: 0 %
NRBC BLD MANUAL-RTO: 0 /100 WBC
OXYHGB MFR BLDMV: 44.4 %
PCO2 BLDMV: 42 MM HG
PH BLDMV: 7.33 UNITS
PLATELET # BLD AUTO: 150 K/MCL (ref 140–450)
PO2 BLDMV: 31 MM HG
POTASSIUM BLD-SCNC: 4.2 MMOL/L (ref 3.4–5.1)
POTASSIUM SERPL-SCNC: 4.7 MMOL/L (ref 3.4–5.1)
PROTHROMBIN TIME: 12.8 SEC (ref 9.7–11.8)
RBC # BLD: 3.03 MIL/MCL (ref 4.5–5.9)
SAO2 % BLDMV: 6 %
SAO2 DF BLDMV: 45 %
SODIUM BLD-SCNC: 135 MMOL/L (ref 135–145)
SODIUM SERPL-SCNC: 140 MMOL/L (ref 135–145)
WBC # BLD: 14.8 K/MCL (ref 4.2–11)

## 2023-09-21 PROCEDURE — 10002800 HB RX 250 W HCPCS: Performed by: NURSE PRACTITIONER

## 2023-09-21 PROCEDURE — 85610 PROTHROMBIN TIME: CPT | Performed by: NURSE PRACTITIONER

## 2023-09-21 PROCEDURE — 10002803 HB RX 637: Performed by: NURSE PRACTITIONER

## 2023-09-21 PROCEDURE — 13001086 HB INCENTIVE SPIROMETER W INSTRUCT

## 2023-09-21 PROCEDURE — 85027 COMPLETE CBC AUTOMATED: CPT | Performed by: NURSE PRACTITIONER

## 2023-09-21 PROCEDURE — 80048 BASIC METABOLIC PNL TOTAL CA: CPT | Performed by: NURSE PRACTITIONER

## 2023-09-21 PROCEDURE — 10002803 HB RX 637: Performed by: INTERNAL MEDICINE

## 2023-09-21 PROCEDURE — 10002800 HB RX 250 W HCPCS: Performed by: PHYSICIAN ASSISTANT

## 2023-09-21 PROCEDURE — 82330 ASSAY OF CALCIUM: CPT

## 2023-09-21 PROCEDURE — 97165 OT EVAL LOW COMPLEX 30 MIN: CPT

## 2023-09-21 PROCEDURE — 10002800 HB RX 250 W HCPCS: Performed by: EMERGENCY MEDICINE

## 2023-09-21 PROCEDURE — 10004180 HB COUNTER-TRANSPORT

## 2023-09-21 PROCEDURE — 83735 ASSAY OF MAGNESIUM: CPT | Performed by: NURSE PRACTITIONER

## 2023-09-21 PROCEDURE — 96372 THER/PROPH/DIAG INJ SC/IM: CPT | Performed by: NURSE PRACTITIONER

## 2023-09-21 PROCEDURE — 85384 FIBRINOGEN ACTIVITY: CPT | Performed by: NURSE PRACTITIONER

## 2023-09-21 PROCEDURE — 10002016 HB COUNTER INCENTIVE SPIROMETRY

## 2023-09-21 PROCEDURE — 96372 THER/PROPH/DIAG INJ SC/IM: CPT | Performed by: INTERNAL MEDICINE

## 2023-09-21 PROCEDURE — 10002803 HB RX 637: Performed by: HOSPITALIST

## 2023-09-21 PROCEDURE — 97162 PT EVAL MOD COMPLEX 30 MIN: CPT

## 2023-09-21 PROCEDURE — 82947 ASSAY GLUCOSE BLOOD QUANT: CPT

## 2023-09-21 PROCEDURE — 97530 THERAPEUTIC ACTIVITIES: CPT

## 2023-09-21 PROCEDURE — 85018 HEMOGLOBIN: CPT

## 2023-09-21 PROCEDURE — P9045 ALBUMIN (HUMAN), 5%, 250 ML: HCPCS | Performed by: PHYSICIAN ASSISTANT

## 2023-09-21 PROCEDURE — 84132 ASSAY OF SERUM POTASSIUM: CPT

## 2023-09-21 PROCEDURE — 10004651 HB RX, NO CHARGE ITEM: Performed by: NURSE PRACTITIONER

## 2023-09-21 PROCEDURE — 10002800 HB RX 250 W HCPCS: Performed by: INTERNAL MEDICINE

## 2023-09-21 PROCEDURE — 10002807 HB RX 258: Performed by: NURSE PRACTITIONER

## 2023-09-21 PROCEDURE — 10000008 HB ROOM CHARGE ICU OR CCU

## 2023-09-21 RX ORDER — POLYETHYLENE GLYCOL 3350 17 G/17G
17 POWDER, FOR SOLUTION ORAL 2 TIMES DAILY
Status: DISCONTINUED | OUTPATIENT
Start: 2023-09-21 | End: 2023-09-25 | Stop reason: HOSPADM

## 2023-09-21 RX ORDER — METOCLOPRAMIDE HYDROCHLORIDE 5 MG/ML
10 INJECTION INTRAMUSCULAR; INTRAVENOUS ONCE
Status: COMPLETED | OUTPATIENT
Start: 2023-09-21 | End: 2023-09-21

## 2023-09-21 RX ORDER — INSULIN LISPRO 100 [IU]/ML
6 INJECTION, SOLUTION INTRAVENOUS; SUBCUTANEOUS ONCE
Status: COMPLETED | OUTPATIENT
Start: 2023-09-21 | End: 2023-09-21

## 2023-09-21 RX ORDER — ROSUVASTATIN CALCIUM 20 MG/1
20 TABLET, COATED ORAL NIGHTLY
Status: DISCONTINUED | OUTPATIENT
Start: 2023-09-21 | End: 2023-09-25 | Stop reason: HOSPADM

## 2023-09-21 RX ORDER — FUROSEMIDE 10 MG/ML
20 INJECTION INTRAMUSCULAR; INTRAVENOUS
Status: DISCONTINUED | OUTPATIENT
Start: 2023-09-21 | End: 2023-09-22

## 2023-09-21 RX ORDER — ALBUMIN, HUMAN INJ 5% 5 %
12.5 SOLUTION INTRAVENOUS ONCE
Status: COMPLETED | OUTPATIENT
Start: 2023-09-21 | End: 2023-09-21

## 2023-09-21 RX ORDER — INSULIN GLARGINE 100 [IU]/ML
10 INJECTION, SOLUTION SUBCUTANEOUS DAILY
Status: DISCONTINUED | OUTPATIENT
Start: 2023-09-21 | End: 2023-09-22

## 2023-09-21 RX ADMIN — SENNOSIDES AND DOCUSATE SODIUM 2 TABLET: 50; 8.6 TABLET ORAL at 08:09

## 2023-09-21 RX ADMIN — ALBUMIN HUMAN 12.5 G: 0.05 INJECTION, SOLUTION INTRAVENOUS at 08:39

## 2023-09-21 RX ADMIN — ACETAMINOPHEN 1000 MG: 500 TABLET ORAL at 21:00

## 2023-09-21 RX ADMIN — PANTOPRAZOLE SODIUM 40 MG: 40 TABLET, DELAYED RELEASE ORAL at 05:00

## 2023-09-21 RX ADMIN — CEFAZOLIN SODIUM 2000 MG: 300 INJECTION, POWDER, LYOPHILIZED, FOR SOLUTION INTRAVENOUS at 14:16

## 2023-09-21 RX ADMIN — INSULIN GLARGINE 10 UNITS: 100 INJECTION, SOLUTION SUBCUTANEOUS at 09:35

## 2023-09-21 RX ADMIN — FUROSEMIDE 20 MG: 10 INJECTION, SOLUTION INTRAMUSCULAR; INTRAVENOUS at 10:13

## 2023-09-21 RX ADMIN — INSULIN LISPRO 8 UNITS: 100 INJECTION, SOLUTION INTRAVENOUS; SUBCUTANEOUS at 18:45

## 2023-09-21 RX ADMIN — VANCOMYCIN HYDROCHLORIDE 1000 MG: 1 INJECTION, POWDER, LYOPHILIZED, FOR SOLUTION INTRAVENOUS at 08:23

## 2023-09-21 RX ADMIN — ONDANSETRON 4 MG: 2 INJECTION INTRAMUSCULAR; INTRAVENOUS at 02:37

## 2023-09-21 RX ADMIN — POLYETHYLENE GLYCOL (3350) 17 G: 17 POWDER, FOR SOLUTION ORAL at 08:09

## 2023-09-21 RX ADMIN — CEFAZOLIN SODIUM 2000 MG: 300 INJECTION, POWDER, LYOPHILIZED, FOR SOLUTION INTRAVENOUS at 06:46

## 2023-09-21 RX ADMIN — CEFAZOLIN SODIUM 2000 MG: 300 INJECTION, POWDER, LYOPHILIZED, FOR SOLUTION INTRAVENOUS at 21:00

## 2023-09-21 RX ADMIN — FUROSEMIDE 20 MG: 10 INJECTION, SOLUTION INTRAMUSCULAR; INTRAVENOUS at 16:34

## 2023-09-21 RX ADMIN — ROSUVASTATIN CALCIUM 20 MG: 20 TABLET, FILM COATED ORAL at 08:09

## 2023-09-21 RX ADMIN — POTASSIUM CHLORIDE, DEXTROSE MONOHYDRATE AND SODIUM CHLORIDE: 300; 5; 450 INJECTION, SOLUTION INTRAVENOUS at 06:09

## 2023-09-21 RX ADMIN — POLYETHYLENE GLYCOL (3350) 17 G: 17 POWDER, FOR SOLUTION ORAL at 20:18

## 2023-09-21 RX ADMIN — AMIODARONE HYDROCHLORIDE 0.5 MG/MIN: 1.8 INJECTION, SOLUTION INTRAVENOUS at 04:09

## 2023-09-21 RX ADMIN — METOCLOPRAMIDE HYDROCHLORIDE 10 MG: 5 INJECTION INTRAMUSCULAR; INTRAVENOUS at 03:59

## 2023-09-21 RX ADMIN — ACETAMINOPHEN 1000 MG: 500 TABLET ORAL at 14:16

## 2023-09-21 RX ADMIN — ACETAMINOPHEN 1000 MG: 500 TABLET ORAL at 05:00

## 2023-09-21 RX ADMIN — ASPIRIN 81 MG: 81 TABLET, COATED ORAL at 08:09

## 2023-09-21 RX ADMIN — MORPHINE SULFATE 2 MG: 2 INJECTION, SOLUTION INTRAMUSCULAR; INTRAVENOUS at 20:55

## 2023-09-21 RX ADMIN — ROSUVASTATIN CALCIUM 20 MG: 20 TABLET, FILM COATED ORAL at 20:18

## 2023-09-21 RX ADMIN — INSULIN LISPRO 6 UNITS: 100 INJECTION, SOLUTION INTRAVENOUS; SUBCUTANEOUS at 21:59

## 2023-09-21 RX ADMIN — INSULIN LISPRO 4 UNITS: 100 INJECTION, SOLUTION INTRAVENOUS; SUBCUTANEOUS at 11:29

## 2023-09-21 RX ADMIN — ONDANSETRON 4 MG: 2 INJECTION INTRAMUSCULAR; INTRAVENOUS at 00:59

## 2023-09-21 RX ADMIN — AMIODARONE HYDROCHLORIDE 200 MG: 200 TABLET ORAL at 16:17

## 2023-09-21 RX ADMIN — LEVOTHYROXINE SODIUM 50 MCG: 0.05 TABLET ORAL at 05:00

## 2023-09-21 ASSESSMENT — PAIN SCALES - GENERAL
PAINLEVEL_OUTOF10: 0
PAINLEVEL_OUTOF10: 2
PAINLEVEL_OUTOF10: 0
PAINLEVEL_OUTOF10: 0
PAINLEVEL_OUTOF10: 2
PAINLEVEL_OUTOF10: 0
PAINLEVEL_OUTOF10: 2
PAINLEVEL_OUTOF10: 8
PAINLEVEL_OUTOF10: 2
PAINLEVEL_OUTOF10: 2

## 2023-09-21 ASSESSMENT — ACTIVITIES OF DAILY LIVING (ADL): PRIOR_ADL: INDEPENDENT

## 2023-09-21 ASSESSMENT — ENCOUNTER SYMPTOMS
PAIN SEVERITY NOW: 3
PAIN SEVERITY NOW: 0

## 2023-09-21 ASSESSMENT — COGNITIVE AND FUNCTIONAL STATUS - GENERAL
DAILY_ACTIVITY_CONVERTED_SCORE: 34.69
HELP NEEDED FOR TOILETING: A LOT
HELP NEEDED FOR PERSONAL GROOMING: A LITTLE
HELP NEEDED DRESSING REGULAR UPPER BODY CLOTHING: A LOT
HELP NEEDED FOR BATHING: A LOT
BASIC_MOBILITY_RAW_SCORE: 16
HELP NEEDED DRESSING REGULAR LOWER BODY CLOTHING: A LOT
DAILY_ACTIVITY_RAW_SCORE: 15
BASIC_MOBILITY_CONVERTED_SCORE: 38.32

## 2023-09-22 LAB
ANION GAP SERPL CALC-SCNC: 8 MMOL/L (ref 7–19)
ATRIAL RATE (BPM): 77
BUN SERPL-MCNC: 20 MG/DL (ref 6–20)
BUN/CREAT SERPL: 19 (ref 7–25)
CALCIUM SERPL-MCNC: 8.1 MG/DL (ref 8.4–10.2)
CHLORIDE SERPL-SCNC: 108 MMOL/L (ref 97–110)
CO2 SERPL-SCNC: 26 MMOL/L (ref 21–32)
CREAT SERPL-MCNC: 1.04 MG/DL (ref 0.67–1.17)
DEPRECATED RDW RBC: 44.5 FL (ref 39–50)
EGFRCR SERPLBLD CKD-EPI 2021: 82 ML/MIN/{1.73_M2}
ERYTHROCYTE [DISTWIDTH] IN BLOOD: 12.9 % (ref 11–15)
ERYTHROCYTE [SEDIMENTATION RATE] IN BLOOD BY WESTERGREN METHOD: 11 MM/HR (ref 0–20)
FASTING DURATION TIME PATIENT: ABNORMAL H
FIBRINOGEN PPP-MCNC: 461 MG/DL (ref 190–425)
GLUCOSE BLDC GLUCOMTR-MCNC: 195 MG/DL (ref 70–99)
GLUCOSE BLDC GLUCOMTR-MCNC: 205 MG/DL (ref 70–99)
GLUCOSE BLDC GLUCOMTR-MCNC: 211 MG/DL (ref 70–99)
GLUCOSE BLDC GLUCOMTR-MCNC: 220 MG/DL (ref 70–99)
GLUCOSE BLDC GLUCOMTR-MCNC: 254 MG/DL (ref 70–99)
GLUCOSE BLDC GLUCOMTR-MCNC: 322 MG/DL (ref 70–99)
GLUCOSE SERPL-MCNC: 237 MG/DL (ref 70–99)
HCT VFR BLD CALC: 28.2 % (ref 39–51)
HGB BLD-MCNC: 9.1 G/DL (ref 13–17)
INR PPP: 1.2
LACTATE BLDV-SCNC: 2 MMOL/L (ref 0–2)
MAGNESIUM SERPL-MCNC: 2.5 MG/DL (ref 1.7–2.4)
MCH RBC QN AUTO: 30.2 PG (ref 26–34)
MCHC RBC AUTO-ENTMCNC: 32.3 G/DL (ref 32–36.5)
MCV RBC AUTO: 93.7 FL (ref 78–100)
NRBC BLD MANUAL-RTO: 0 /100 WBC
P AXIS (DEGREES): 66
PLATELET # BLD AUTO: 123 K/MCL (ref 140–450)
POTASSIUM SERPL-SCNC: 4.9 MMOL/L (ref 3.4–5.1)
PR-INTERVAL (MSEC): 144
PROTHROMBIN TIME: 13.1 SEC (ref 9.7–11.8)
QRS-INTERVAL (MSEC): 94
QT-INTERVAL (MSEC): 406
QTC: 459
R AXIS (DEGREES): -47
RBC # BLD: 3.01 MIL/MCL (ref 4.5–5.9)
REPORT TEXT: NORMAL
SODIUM SERPL-SCNC: 137 MMOL/L (ref 135–145)
T AXIS (DEGREES): 83
VENTRICULAR RATE EKG/MIN (BPM): 77
WBC # BLD: 13.3 K/MCL (ref 4.2–11)

## 2023-09-22 PROCEDURE — 10004180 HB COUNTER-TRANSPORT

## 2023-09-22 PROCEDURE — 10002800 HB RX 250 W HCPCS: Performed by: PHYSICIAN ASSISTANT

## 2023-09-22 PROCEDURE — 96372 THER/PROPH/DIAG INJ SC/IM: CPT | Performed by: INTERNAL MEDICINE

## 2023-09-22 PROCEDURE — 97116 GAIT TRAINING THERAPY: CPT

## 2023-09-22 PROCEDURE — 10002800 HB RX 250 W HCPCS: Performed by: NURSE PRACTITIONER

## 2023-09-22 PROCEDURE — 10004651 HB RX, NO CHARGE ITEM: Performed by: NURSE PRACTITIONER

## 2023-09-22 PROCEDURE — 80048 BASIC METABOLIC PNL TOTAL CA: CPT | Performed by: NURSE PRACTITIONER

## 2023-09-22 PROCEDURE — 10002803 HB RX 637: Performed by: INTERNAL MEDICINE

## 2023-09-22 PROCEDURE — 85027 COMPLETE CBC AUTOMATED: CPT | Performed by: NURSE PRACTITIONER

## 2023-09-22 PROCEDURE — 13003289 HB OXYGEN THERAPY DAILY

## 2023-09-22 PROCEDURE — 85610 PROTHROMBIN TIME: CPT | Performed by: NURSE PRACTITIONER

## 2023-09-22 PROCEDURE — 10002803 HB RX 637: Performed by: NURSE PRACTITIONER

## 2023-09-22 PROCEDURE — 83735 ASSAY OF MAGNESIUM: CPT | Performed by: NURSE PRACTITIONER

## 2023-09-22 PROCEDURE — 96372 THER/PROPH/DIAG INJ SC/IM: CPT | Performed by: PHYSICIAN ASSISTANT

## 2023-09-22 PROCEDURE — 93005 ELECTROCARDIOGRAM TRACING: CPT | Performed by: NURSE PRACTITIONER

## 2023-09-22 PROCEDURE — 85652 RBC SED RATE AUTOMATED: CPT | Performed by: PHYSICIAN ASSISTANT

## 2023-09-22 PROCEDURE — 93005 ELECTROCARDIOGRAM TRACING: CPT | Performed by: PHYSICIAN ASSISTANT

## 2023-09-22 PROCEDURE — 10002803 HB RX 637: Performed by: HOSPITALIST

## 2023-09-22 PROCEDURE — 10000008 HB ROOM CHARGE ICU OR CCU

## 2023-09-22 PROCEDURE — 10002803 HB RX 637: Performed by: PHYSICIAN ASSISTANT

## 2023-09-22 PROCEDURE — 85384 FIBRINOGEN ACTIVITY: CPT | Performed by: NURSE PRACTITIONER

## 2023-09-22 PROCEDURE — 36415 COLL VENOUS BLD VENIPUNCTURE: CPT | Performed by: NURSE PRACTITIONER

## 2023-09-22 PROCEDURE — 97110 THERAPEUTIC EXERCISES: CPT

## 2023-09-22 PROCEDURE — 83605 ASSAY OF LACTIC ACID: CPT | Performed by: PHYSICIAN ASSISTANT

## 2023-09-22 PROCEDURE — 10002800 HB RX 250 W HCPCS: Performed by: INTERNAL MEDICINE

## 2023-09-22 PROCEDURE — 96372 THER/PROPH/DIAG INJ SC/IM: CPT | Performed by: NURSE PRACTITIONER

## 2023-09-22 RX ORDER — HEPARIN SODIUM 5000 [USP'U]/ML
5000 INJECTION, SOLUTION INTRAVENOUS; SUBCUTANEOUS EVERY 8 HOURS SCHEDULED
Status: DISCONTINUED | OUTPATIENT
Start: 2023-09-22 | End: 2023-09-25 | Stop reason: HOSPADM

## 2023-09-22 RX ORDER — METOPROLOL TARTRATE 25 MG/1
12.5 TABLET, FILM COATED ORAL ONCE
Status: COMPLETED | OUTPATIENT
Start: 2023-09-22 | End: 2023-09-22

## 2023-09-22 RX ORDER — DOBUTAMINE HYDROCHLORIDE 200 MG/100ML
1 INJECTION INTRAVENOUS CONTINUOUS
Status: DISCONTINUED | OUTPATIENT
Start: 2023-09-22 | End: 2023-09-22

## 2023-09-22 RX ORDER — INSULIN LISPRO 100 [IU]/ML
8 INJECTION, SOLUTION INTRAVENOUS; SUBCUTANEOUS ONCE
Status: COMPLETED | OUTPATIENT
Start: 2023-09-22 | End: 2023-09-22

## 2023-09-22 RX ORDER — INSULIN GLARGINE 100 [IU]/ML
10 INJECTION, SOLUTION SUBCUTANEOUS ONCE
Status: DISCONTINUED | OUTPATIENT
Start: 2023-09-22 | End: 2023-09-22

## 2023-09-22 RX ORDER — FUROSEMIDE 10 MG/ML
40 INJECTION INTRAMUSCULAR; INTRAVENOUS
Status: DISCONTINUED | OUTPATIENT
Start: 2023-09-22 | End: 2023-09-25 | Stop reason: ALTCHOICE

## 2023-09-22 RX ORDER — INSULIN GLARGINE 100 [IU]/ML
12 INJECTION, SOLUTION SUBCUTANEOUS DAILY
Status: DISCONTINUED | OUTPATIENT
Start: 2023-09-23 | End: 2023-09-25

## 2023-09-22 RX ORDER — FUROSEMIDE 10 MG/ML
20 INJECTION INTRAMUSCULAR; INTRAVENOUS ONCE
Status: COMPLETED | OUTPATIENT
Start: 2023-09-22 | End: 2023-09-22

## 2023-09-22 RX ORDER — COLCHICINE 0.6 MG/1
0.6 TABLET ORAL EVERY OTHER DAY
Status: DISCONTINUED | OUTPATIENT
Start: 2023-09-22 | End: 2023-09-25 | Stop reason: HOSPADM

## 2023-09-22 RX ORDER — INSULIN GLARGINE 100 [IU]/ML
14 INJECTION, SOLUTION SUBCUTANEOUS DAILY
Status: DISCONTINUED | OUTPATIENT
Start: 2023-09-23 | End: 2023-09-22

## 2023-09-22 RX ADMIN — INSULIN LISPRO 8 UNITS: 100 INJECTION, SOLUTION INTRAVENOUS; SUBCUTANEOUS at 21:19

## 2023-09-22 RX ADMIN — ACETAMINOPHEN 1000 MG: 500 TABLET ORAL at 14:43

## 2023-09-22 RX ADMIN — HYDROCODONE BITARTRATE AND ACETAMINOPHEN 1 TABLET: 5; 325 TABLET ORAL at 08:19

## 2023-09-22 RX ADMIN — FUROSEMIDE 20 MG: 10 INJECTION, SOLUTION INTRAMUSCULAR; INTRAVENOUS at 09:18

## 2023-09-22 RX ADMIN — PANTOPRAZOLE SODIUM 40 MG: 40 TABLET, DELAYED RELEASE ORAL at 06:22

## 2023-09-22 RX ADMIN — INSULIN LISPRO 9 UNITS: 100 INJECTION, SOLUTION INTRAVENOUS; SUBCUTANEOUS at 17:49

## 2023-09-22 RX ADMIN — METOPROLOL TARTRATE 25 MG: 25 TABLET, FILM COATED ORAL at 21:19

## 2023-09-22 RX ADMIN — COLCHICINE 0.6 MG: 0.6 TABLET ORAL at 21:19

## 2023-09-22 RX ADMIN — SENNOSIDES AND DOCUSATE SODIUM 2 TABLET: 50; 8.6 TABLET ORAL at 08:19

## 2023-09-22 RX ADMIN — INSULIN LISPRO 8 UNITS: 100 INJECTION, SOLUTION INTRAVENOUS; SUBCUTANEOUS at 06:20

## 2023-09-22 RX ADMIN — ASPIRIN 81 MG: 81 TABLET, COATED ORAL at 08:19

## 2023-09-22 RX ADMIN — POLYETHYLENE GLYCOL (3350) 17 G: 17 POWDER, FOR SOLUTION ORAL at 08:18

## 2023-09-22 RX ADMIN — HEPARIN SODIUM 5000 UNITS: 5000 INJECTION INTRAVENOUS; SUBCUTANEOUS at 21:19

## 2023-09-22 RX ADMIN — AMIODARONE HYDROCHLORIDE 200 MG: 200 TABLET ORAL at 17:49

## 2023-09-22 RX ADMIN — HYDROCODONE BITARTRATE AND ACETAMINOPHEN 1 TABLET: 5; 325 TABLET ORAL at 22:56

## 2023-09-22 RX ADMIN — ROSUVASTATIN CALCIUM 20 MG: 20 TABLET, FILM COATED ORAL at 21:19

## 2023-09-22 RX ADMIN — LEVOTHYROXINE SODIUM 50 MCG: 0.05 TABLET ORAL at 06:21

## 2023-09-22 RX ADMIN — FUROSEMIDE 40 MG: 10 INJECTION, SOLUTION INTRAMUSCULAR; INTRAVENOUS at 17:50

## 2023-09-22 RX ADMIN — POLYETHYLENE GLYCOL (3350) 17 G: 17 POWDER, FOR SOLUTION ORAL at 21:19

## 2023-09-22 RX ADMIN — INSULIN GLARGINE 10 UNITS: 100 INJECTION, SOLUTION SUBCUTANEOUS at 08:19

## 2023-09-22 RX ADMIN — ACETAMINOPHEN 1000 MG: 500 TABLET ORAL at 06:21

## 2023-09-22 RX ADMIN — AMIODARONE HYDROCHLORIDE 200 MG: 200 TABLET ORAL at 08:20

## 2023-09-22 RX ADMIN — METOPROLOL TARTRATE 12.5 MG: 25 TABLET, FILM COATED ORAL at 00:39

## 2023-09-22 RX ADMIN — FUROSEMIDE 20 MG: 10 INJECTION, SOLUTION INTRAMUSCULAR; INTRAVENOUS at 08:20

## 2023-09-22 RX ADMIN — ACETAMINOPHEN 1000 MG: 500 TABLET ORAL at 21:19

## 2023-09-22 RX ADMIN — INSULIN LISPRO 2 UNITS: 100 INJECTION, SOLUTION INTRAVENOUS; SUBCUTANEOUS at 08:20

## 2023-09-22 ASSESSMENT — PAIN SCALES - GENERAL
PAINLEVEL_OUTOF10: 8
PAINLEVEL_OUTOF10: 3
PAINLEVEL_OUTOF10: 0
PAINLEVEL_OUTOF10: 0
PAINLEVEL_OUTOF10: 3
PAINLEVEL_OUTOF10: 0
PAINLEVEL_OUTOF10: 0
PAINLEVEL_OUTOF10: 1

## 2023-09-22 ASSESSMENT — ENCOUNTER SYMPTOMS: PAIN SEVERITY NOW: 0

## 2023-09-22 ASSESSMENT — COGNITIVE AND FUNCTIONAL STATUS - GENERAL
BASIC_MOBILITY_CONVERTED_SCORE: 38.32
BASIC_MOBILITY_RAW_SCORE: 16

## 2023-09-23 LAB
ANION GAP SERPL CALC-SCNC: 7 MMOL/L (ref 7–19)
BUN SERPL-MCNC: 26 MG/DL (ref 6–20)
BUN/CREAT SERPL: 25 (ref 7–25)
CALCIUM SERPL-MCNC: 8.2 MG/DL (ref 8.4–10.2)
CHLORIDE SERPL-SCNC: 104 MMOL/L (ref 97–110)
CO2 SERPL-SCNC: 30 MMOL/L (ref 21–32)
CREAT SERPL-MCNC: 1.05 MG/DL (ref 0.67–1.17)
DEPRECATED RDW RBC: 43.1 FL (ref 39–50)
EGFRCR SERPLBLD CKD-EPI 2021: 81 ML/MIN/{1.73_M2}
ERYTHROCYTE [DISTWIDTH] IN BLOOD: 12.8 % (ref 11–15)
FASTING DURATION TIME PATIENT: ABNORMAL H
GLUCOSE BLDC GLUCOMTR-MCNC: 153 MG/DL (ref 70–99)
GLUCOSE BLDC GLUCOMTR-MCNC: 231 MG/DL (ref 70–99)
GLUCOSE BLDC GLUCOMTR-MCNC: 241 MG/DL (ref 70–99)
GLUCOSE BLDC GLUCOMTR-MCNC: 253 MG/DL (ref 70–99)
GLUCOSE SERPL-MCNC: 235 MG/DL (ref 70–99)
HCT VFR BLD CALC: 27.8 % (ref 39–51)
HGB BLD-MCNC: 9.1 G/DL (ref 13–17)
MAGNESIUM SERPL-MCNC: 2.4 MG/DL (ref 1.7–2.4)
MCH RBC QN AUTO: 30.2 PG (ref 26–34)
MCHC RBC AUTO-ENTMCNC: 32.7 G/DL (ref 32–36.5)
MCV RBC AUTO: 92.4 FL (ref 78–100)
NRBC BLD MANUAL-RTO: 0 /100 WBC
PLATELET # BLD AUTO: 131 K/MCL (ref 140–450)
POTASSIUM SERPL-SCNC: 4.2 MMOL/L (ref 3.4–5.1)
RBC # BLD: 3.01 MIL/MCL (ref 4.5–5.9)
SODIUM SERPL-SCNC: 137 MMOL/L (ref 135–145)
WBC # BLD: 12.7 K/MCL (ref 4.2–11)

## 2023-09-23 PROCEDURE — 10002803 HB RX 637: Performed by: NURSE PRACTITIONER

## 2023-09-23 PROCEDURE — 10002800 HB RX 250 W HCPCS: Performed by: PHYSICIAN ASSISTANT

## 2023-09-23 PROCEDURE — 85027 COMPLETE CBC AUTOMATED: CPT | Performed by: NURSE PRACTITIONER

## 2023-09-23 PROCEDURE — 96372 THER/PROPH/DIAG INJ SC/IM: CPT | Performed by: HOSPITALIST

## 2023-09-23 PROCEDURE — 80048 BASIC METABOLIC PNL TOTAL CA: CPT | Performed by: NURSE PRACTITIONER

## 2023-09-23 PROCEDURE — 10002803 HB RX 637: Performed by: INTERNAL MEDICINE

## 2023-09-23 PROCEDURE — 10002800 HB RX 250 W HCPCS: Performed by: NURSE PRACTITIONER

## 2023-09-23 PROCEDURE — 36415 COLL VENOUS BLD VENIPUNCTURE: CPT | Performed by: NURSE PRACTITIONER

## 2023-09-23 PROCEDURE — 96372 THER/PROPH/DIAG INJ SC/IM: CPT | Performed by: NURSE PRACTITIONER

## 2023-09-23 PROCEDURE — 10002800 HB RX 250 W HCPCS: Performed by: HOSPITALIST

## 2023-09-23 PROCEDURE — 96372 THER/PROPH/DIAG INJ SC/IM: CPT | Performed by: PHYSICIAN ASSISTANT

## 2023-09-23 PROCEDURE — 10006031 HB ROOM CHARGE TELEMETRY

## 2023-09-23 PROCEDURE — 83735 ASSAY OF MAGNESIUM: CPT | Performed by: NURSE PRACTITIONER

## 2023-09-23 PROCEDURE — 10004651 HB RX, NO CHARGE ITEM: Performed by: NURSE PRACTITIONER

## 2023-09-23 PROCEDURE — 10002803 HB RX 637: Performed by: HOSPITALIST

## 2023-09-23 RX ORDER — CLOPIDOGREL BISULFATE 75 MG/1
75 TABLET ORAL DAILY
Status: DISCONTINUED | OUTPATIENT
Start: 2023-09-23 | End: 2023-09-25 | Stop reason: HOSPADM

## 2023-09-23 RX ORDER — INSULIN LISPRO 100 [IU]/ML
2 INJECTION, SOLUTION INTRAVENOUS; SUBCUTANEOUS ONCE
Status: COMPLETED | OUTPATIENT
Start: 2023-09-23 | End: 2023-09-23

## 2023-09-23 RX ADMIN — HEPARIN SODIUM 5000 UNITS: 5000 INJECTION INTRAVENOUS; SUBCUTANEOUS at 13:21

## 2023-09-23 RX ADMIN — ROSUVASTATIN CALCIUM 20 MG: 20 TABLET, FILM COATED ORAL at 21:17

## 2023-09-23 RX ADMIN — HEPARIN SODIUM 5000 UNITS: 5000 INJECTION INTRAVENOUS; SUBCUTANEOUS at 21:17

## 2023-09-23 RX ADMIN — INSULIN LISPRO 7 UNITS: 100 INJECTION, SOLUTION INTRAVENOUS; SUBCUTANEOUS at 17:37

## 2023-09-23 RX ADMIN — ASPIRIN 81 MG: 81 TABLET, COATED ORAL at 08:00

## 2023-09-23 RX ADMIN — FUROSEMIDE 40 MG: 10 INJECTION, SOLUTION INTRAMUSCULAR; INTRAVENOUS at 16:52

## 2023-09-23 RX ADMIN — POLYETHYLENE GLYCOL (3350) 17 G: 17 POWDER, FOR SOLUTION ORAL at 21:15

## 2023-09-23 RX ADMIN — PANTOPRAZOLE SODIUM 40 MG: 40 TABLET, DELAYED RELEASE ORAL at 06:45

## 2023-09-23 RX ADMIN — INSULIN LISPRO 2 UNITS: 100 INJECTION, SOLUTION INTRAVENOUS; SUBCUTANEOUS at 07:50

## 2023-09-23 RX ADMIN — ACETAMINOPHEN 650 MG: 500 TABLET ORAL at 14:00

## 2023-09-23 RX ADMIN — INSULIN GLARGINE 12 UNITS: 100 INJECTION, SOLUTION SUBCUTANEOUS at 08:05

## 2023-09-23 RX ADMIN — ONDANSETRON 4 MG: 2 INJECTION INTRAMUSCULAR; INTRAVENOUS at 03:14

## 2023-09-23 RX ADMIN — INSULIN LISPRO 4 UNITS: 100 INJECTION, SOLUTION INTRAVENOUS; SUBCUTANEOUS at 07:50

## 2023-09-23 RX ADMIN — AMIODARONE HYDROCHLORIDE 200 MG: 200 TABLET ORAL at 08:00

## 2023-09-23 RX ADMIN — ACETAMINOPHEN 1000 MG: 500 TABLET ORAL at 21:15

## 2023-09-23 RX ADMIN — HEPARIN SODIUM 5000 UNITS: 5000 INJECTION INTRAVENOUS; SUBCUTANEOUS at 06:45

## 2023-09-23 RX ADMIN — AMIODARONE HYDROCHLORIDE 200 MG: 200 TABLET ORAL at 17:37

## 2023-09-23 RX ADMIN — ACETAMINOPHEN 1000 MG: 500 TABLET ORAL at 06:45

## 2023-09-23 RX ADMIN — CLOPIDOGREL BISULFATE 75 MG: 75 TABLET, FILM COATED ORAL at 13:18

## 2023-09-23 RX ADMIN — LEVOTHYROXINE SODIUM 50 MCG: 0.05 TABLET ORAL at 06:45

## 2023-09-23 RX ADMIN — FUROSEMIDE 40 MG: 10 INJECTION, SOLUTION INTRAMUSCULAR; INTRAVENOUS at 08:00

## 2023-09-23 RX ADMIN — INSULIN LISPRO 6 UNITS: 100 INJECTION, SOLUTION INTRAVENOUS; SUBCUTANEOUS at 11:42

## 2023-09-23 ASSESSMENT — PAIN SCALES - GENERAL
PAINLEVEL_OUTOF10: 4
PAINLEVEL_OUTOF10: 0

## 2023-09-24 LAB
ANION GAP SERPL CALC-SCNC: 8 MMOL/L (ref 7–19)
BUN SERPL-MCNC: 28 MG/DL (ref 6–20)
BUN/CREAT SERPL: 28 (ref 7–25)
CALCIUM SERPL-MCNC: 8.3 MG/DL (ref 8.4–10.2)
CHLORIDE SERPL-SCNC: 102 MMOL/L (ref 97–110)
CO2 SERPL-SCNC: 30 MMOL/L (ref 21–32)
CREAT SERPL-MCNC: 1 MG/DL (ref 0.67–1.17)
DEPRECATED RDW RBC: 42.2 FL (ref 39–50)
EGFRCR SERPLBLD CKD-EPI 2021: 86 ML/MIN/{1.73_M2}
ERYTHROCYTE [DISTWIDTH] IN BLOOD: 12.5 % (ref 11–15)
FASTING DURATION TIME PATIENT: ABNORMAL H
GLUCOSE BLDC GLUCOMTR-MCNC: 202 MG/DL (ref 70–99)
GLUCOSE BLDC GLUCOMTR-MCNC: 290 MG/DL (ref 70–99)
GLUCOSE BLDC GLUCOMTR-MCNC: 311 MG/DL (ref 70–99)
GLUCOSE SERPL-MCNC: 259 MG/DL (ref 70–99)
HCT VFR BLD CALC: 29 % (ref 39–51)
HGB BLD-MCNC: 9.5 G/DL (ref 13–17)
MCH RBC QN AUTO: 30.1 PG (ref 26–34)
MCHC RBC AUTO-ENTMCNC: 32.8 G/DL (ref 32–36.5)
MCV RBC AUTO: 91.8 FL (ref 78–100)
NRBC BLD MANUAL-RTO: 0 /100 WBC
PLATELET # BLD AUTO: 157 K/MCL (ref 140–450)
POTASSIUM SERPL-SCNC: 4.4 MMOL/L (ref 3.4–5.1)
RBC # BLD: 3.16 MIL/MCL (ref 4.5–5.9)
SODIUM SERPL-SCNC: 136 MMOL/L (ref 135–145)
WBC # BLD: 9.4 K/MCL (ref 4.2–11)

## 2023-09-24 PROCEDURE — 10002800 HB RX 250 W HCPCS: Performed by: NURSE PRACTITIONER

## 2023-09-24 PROCEDURE — 96372 THER/PROPH/DIAG INJ SC/IM: CPT | Performed by: NURSE PRACTITIONER

## 2023-09-24 PROCEDURE — 10004651 HB RX, NO CHARGE ITEM: Performed by: NURSE PRACTITIONER

## 2023-09-24 PROCEDURE — 80048 BASIC METABOLIC PNL TOTAL CA: CPT | Performed by: NURSE PRACTITIONER

## 2023-09-24 PROCEDURE — 10002800 HB RX 250 W HCPCS: Performed by: HOSPITALIST

## 2023-09-24 PROCEDURE — 36415 COLL VENOUS BLD VENIPUNCTURE: CPT | Performed by: NURSE PRACTITIONER

## 2023-09-24 PROCEDURE — 96372 THER/PROPH/DIAG INJ SC/IM: CPT | Performed by: HOSPITALIST

## 2023-09-24 PROCEDURE — 85027 COMPLETE CBC AUTOMATED: CPT | Performed by: NURSE PRACTITIONER

## 2023-09-24 PROCEDURE — 10002803 HB RX 637: Performed by: HOSPITALIST

## 2023-09-24 PROCEDURE — 10002800 HB RX 250 W HCPCS: Performed by: INTERNAL MEDICINE

## 2023-09-24 PROCEDURE — 96372 THER/PROPH/DIAG INJ SC/IM: CPT | Performed by: INTERNAL MEDICINE

## 2023-09-24 PROCEDURE — 10002803 HB RX 637: Performed by: INTERNAL MEDICINE

## 2023-09-24 PROCEDURE — 10002803 HB RX 637: Performed by: NURSE PRACTITIONER

## 2023-09-24 PROCEDURE — 10006031 HB ROOM CHARGE TELEMETRY

## 2023-09-24 PROCEDURE — 96372 THER/PROPH/DIAG INJ SC/IM: CPT | Performed by: PHYSICIAN ASSISTANT

## 2023-09-24 PROCEDURE — 10002800 HB RX 250 W HCPCS: Performed by: PHYSICIAN ASSISTANT

## 2023-09-24 PROCEDURE — 10002803 HB RX 637: Performed by: PHYSICIAN ASSISTANT

## 2023-09-24 RX ORDER — INSULIN LISPRO 100 [IU]/ML
4 INJECTION, SOLUTION INTRAVENOUS; SUBCUTANEOUS
Status: DISCONTINUED | OUTPATIENT
Start: 2023-09-24 | End: 2023-09-25 | Stop reason: HOSPADM

## 2023-09-24 RX ADMIN — HEPARIN SODIUM 5000 UNITS: 5000 INJECTION INTRAVENOUS; SUBCUTANEOUS at 05:47

## 2023-09-24 RX ADMIN — INSULIN LISPRO 8 UNITS: 100 INJECTION, SOLUTION INTRAVENOUS; SUBCUTANEOUS at 07:28

## 2023-09-24 RX ADMIN — CLOPIDOGREL BISULFATE 75 MG: 75 TABLET, FILM COATED ORAL at 07:29

## 2023-09-24 RX ADMIN — INSULIN LISPRO 12 UNITS: 100 INJECTION, SOLUTION INTRAVENOUS; SUBCUTANEOUS at 21:09

## 2023-09-24 RX ADMIN — POLYETHYLENE GLYCOL (3350) 17 G: 17 POWDER, FOR SOLUTION ORAL at 07:29

## 2023-09-24 RX ADMIN — ROSUVASTATIN CALCIUM 20 MG: 20 TABLET, FILM COATED ORAL at 21:07

## 2023-09-24 RX ADMIN — FUROSEMIDE 40 MG: 10 INJECTION, SOLUTION INTRAMUSCULAR; INTRAVENOUS at 07:30

## 2023-09-24 RX ADMIN — HEPARIN SODIUM 5000 UNITS: 5000 INJECTION INTRAVENOUS; SUBCUTANEOUS at 13:25

## 2023-09-24 RX ADMIN — ACETAMINOPHEN 1000 MG: 500 TABLET ORAL at 05:46

## 2023-09-24 RX ADMIN — LEVOTHYROXINE SODIUM 50 MCG: 0.05 TABLET ORAL at 05:46

## 2023-09-24 RX ADMIN — POLYETHYLENE GLYCOL (3350) 17 G: 17 POWDER, FOR SOLUTION ORAL at 21:07

## 2023-09-24 RX ADMIN — ACETAMINOPHEN 1000 MG: 500 TABLET ORAL at 13:25

## 2023-09-24 RX ADMIN — SENNOSIDES AND DOCUSATE SODIUM 2 TABLET: 50; 8.6 TABLET ORAL at 07:29

## 2023-09-24 RX ADMIN — AMIODARONE HYDROCHLORIDE 200 MG: 200 TABLET ORAL at 17:16

## 2023-09-24 RX ADMIN — INSULIN LISPRO 6 UNITS: 100 INJECTION, SOLUTION INTRAVENOUS; SUBCUTANEOUS at 13:25

## 2023-09-24 RX ADMIN — INSULIN GLARGINE 12 UNITS: 100 INJECTION, SOLUTION SUBCUTANEOUS at 07:29

## 2023-09-24 RX ADMIN — AMIODARONE HYDROCHLORIDE 200 MG: 200 TABLET ORAL at 07:29

## 2023-09-24 RX ADMIN — ASPIRIN 81 MG: 81 TABLET, COATED ORAL at 07:29

## 2023-09-24 RX ADMIN — HYDROCODONE BITARTRATE AND ACETAMINOPHEN 1 TABLET: 5; 325 TABLET ORAL at 10:05

## 2023-09-24 RX ADMIN — ACETAMINOPHEN 1000 MG: 500 TABLET ORAL at 21:06

## 2023-09-24 RX ADMIN — COLCHICINE 0.6 MG: 0.6 TABLET ORAL at 07:29

## 2023-09-24 RX ADMIN — INSULIN LISPRO 4 UNITS: 100 INJECTION, SOLUTION INTRAVENOUS; SUBCUTANEOUS at 17:16

## 2023-09-24 RX ADMIN — PANTOPRAZOLE SODIUM 40 MG: 40 TABLET, DELAYED RELEASE ORAL at 05:46

## 2023-09-24 RX ADMIN — HEPARIN SODIUM 5000 UNITS: 5000 INJECTION INTRAVENOUS; SUBCUTANEOUS at 21:07

## 2023-09-24 RX ADMIN — FUROSEMIDE 40 MG: 10 INJECTION, SOLUTION INTRAMUSCULAR; INTRAVENOUS at 17:16

## 2023-09-24 ASSESSMENT — PAIN SCALES - GENERAL
PAINLEVEL_OUTOF10: 8
PAINLEVEL_OUTOF10: 3
PAINLEVEL_OUTOF10: 6

## 2023-09-25 VITALS
DIASTOLIC BLOOD PRESSURE: 67 MMHG | HEART RATE: 73 BPM | BODY MASS INDEX: 23.88 KG/M2 | TEMPERATURE: 98.2 F | SYSTOLIC BLOOD PRESSURE: 144 MMHG | WEIGHT: 143.3 LBS | OXYGEN SATURATION: 98 % | HEIGHT: 65 IN | RESPIRATION RATE: 18 BRPM

## 2023-09-25 DIAGNOSIS — Z95.1 S/P CABG (CORONARY ARTERY BYPASS GRAFT): Primary | ICD-10-CM

## 2023-09-25 LAB
ANION GAP SERPL CALC-SCNC: 7 MMOL/L (ref 7–19)
BASOPHILS # BLD: 0 K/MCL (ref 0–0.3)
BASOPHILS NFR BLD: 0 %
BUN SERPL-MCNC: 23 MG/DL (ref 6–20)
BUN/CREAT SERPL: 24 (ref 7–25)
CALCIUM SERPL-MCNC: 8.2 MG/DL (ref 8.4–10.2)
CHLORIDE SERPL-SCNC: 101 MMOL/L (ref 97–110)
CO2 SERPL-SCNC: 34 MMOL/L (ref 21–32)
CREAT SERPL-MCNC: 0.97 MG/DL (ref 0.67–1.17)
DEPRECATED RDW RBC: 40.4 FL (ref 39–50)
EGFRCR SERPLBLD CKD-EPI 2021: 89 ML/MIN/{1.73_M2}
EOSINOPHIL # BLD: 0.1 K/MCL (ref 0–0.5)
EOSINOPHIL NFR BLD: 1 %
ERYTHROCYTE [DISTWIDTH] IN BLOOD: 12.3 % (ref 11–15)
FASTING DURATION TIME PATIENT: ABNORMAL H
GLUCOSE BLDC GLUCOMTR-MCNC: 174 MG/DL (ref 70–99)
GLUCOSE BLDC GLUCOMTR-MCNC: 183 MG/DL (ref 70–99)
GLUCOSE SERPL-MCNC: 158 MG/DL (ref 70–99)
HCT VFR BLD CALC: 29.5 % (ref 39–51)
HGB BLD-MCNC: 9.9 G/DL (ref 13–17)
IMM GRANULOCYTES # BLD AUTO: 0.1 K/MCL (ref 0–0.2)
IMM GRANULOCYTES # BLD: 1 %
LYMPHOCYTES # BLD: 0.9 K/MCL (ref 1–4)
LYMPHOCYTES NFR BLD: 10 %
MCH RBC QN AUTO: 29.9 PG (ref 26–34)
MCHC RBC AUTO-ENTMCNC: 33.6 G/DL (ref 32–36.5)
MCV RBC AUTO: 89.1 FL (ref 78–100)
MONOCYTES # BLD: 1.1 K/MCL (ref 0.3–0.9)
MONOCYTES NFR BLD: 13 %
NEUTROPHILS # BLD: 6.4 K/MCL (ref 1.8–7.7)
NEUTROPHILS NFR BLD: 75 %
NRBC BLD MANUAL-RTO: 0 /100 WBC
PLATELET # BLD AUTO: 180 K/MCL (ref 140–450)
POTASSIUM SERPL-SCNC: 3.5 MMOL/L (ref 3.4–5.1)
POTASSIUM SERPL-SCNC: 3.6 MMOL/L (ref 3.4–5.1)
RBC # BLD: 3.31 MIL/MCL (ref 4.5–5.9)
SODIUM SERPL-SCNC: 138 MMOL/L (ref 135–145)
WBC # BLD: 8.5 K/MCL (ref 4.2–11)

## 2023-09-25 PROCEDURE — 36415 COLL VENOUS BLD VENIPUNCTURE: CPT | Performed by: INTERNAL MEDICINE

## 2023-09-25 PROCEDURE — 96372 THER/PROPH/DIAG INJ SC/IM: CPT | Performed by: PHYSICIAN ASSISTANT

## 2023-09-25 PROCEDURE — 80048 BASIC METABOLIC PNL TOTAL CA: CPT | Performed by: INTERNAL MEDICINE

## 2023-09-25 PROCEDURE — 96372 THER/PROPH/DIAG INJ SC/IM: CPT | Performed by: HOSPITALIST

## 2023-09-25 PROCEDURE — 93010 ELECTROCARDIOGRAM REPORT: CPT | Performed by: SPECIALIST

## 2023-09-25 PROCEDURE — 10002800 HB RX 250 W HCPCS: Performed by: HOSPITALIST

## 2023-09-25 PROCEDURE — 10002800 HB RX 250 W HCPCS: Performed by: INTERNAL MEDICINE

## 2023-09-25 PROCEDURE — 85025 COMPLETE CBC W/AUTO DIFF WBC: CPT | Performed by: INTERNAL MEDICINE

## 2023-09-25 PROCEDURE — 10002803 HB RX 637: Performed by: NURSE PRACTITIONER

## 2023-09-25 PROCEDURE — 84132 ASSAY OF SERUM POTASSIUM: CPT | Performed by: HOSPITALIST

## 2023-09-25 PROCEDURE — 93005 ELECTROCARDIOGRAM TRACING: CPT | Performed by: PHYSICIAN ASSISTANT

## 2023-09-25 PROCEDURE — 10004651 HB RX, NO CHARGE ITEM: Performed by: NURSE PRACTITIONER

## 2023-09-25 PROCEDURE — 96372 THER/PROPH/DIAG INJ SC/IM: CPT | Performed by: INTERNAL MEDICINE

## 2023-09-25 PROCEDURE — 10002803 HB RX 637: Performed by: HOSPITALIST

## 2023-09-25 PROCEDURE — 97530 THERAPEUTIC ACTIVITIES: CPT

## 2023-09-25 PROCEDURE — 10002803 HB RX 637: Performed by: PHYSICIAN ASSISTANT

## 2023-09-25 PROCEDURE — 10002800 HB RX 250 W HCPCS: Performed by: PHYSICIAN ASSISTANT

## 2023-09-25 PROCEDURE — 97535 SELF CARE MNGMENT TRAINING: CPT

## 2023-09-25 RX ORDER — COLCHICINE 0.6 MG/1
0.6 TABLET ORAL EVERY OTHER DAY
Qty: 10 TABLET | Refills: 0 | Status: ON HOLD | OUTPATIENT
Start: 2023-09-26 | End: 2023-11-01

## 2023-09-25 RX ORDER — LISINOPRIL 5 MG/1
5 TABLET ORAL DAILY
Qty: 30 TABLET | Refills: 0 | Status: ON HOLD | OUTPATIENT
Start: 2023-09-26 | End: 2023-11-01

## 2023-09-25 RX ORDER — FUROSEMIDE 20 MG/1
20 TABLET ORAL DAILY
Qty: 10 TABLET | Refills: 0 | Status: ON HOLD | OUTPATIENT
Start: 2023-09-26 | End: 2023-11-01

## 2023-09-25 RX ORDER — METOPROLOL SUCCINATE 25 MG/1
25 TABLET, EXTENDED RELEASE ORAL DAILY
Status: DISCONTINUED | OUTPATIENT
Start: 2023-09-25 | End: 2023-09-25 | Stop reason: HOSPADM

## 2023-09-25 RX ORDER — CLOPIDOGREL BISULFATE 75 MG/1
75 TABLET ORAL DAILY
Qty: 30 TABLET | Refills: 11 | Status: SHIPPED | OUTPATIENT
Start: 2023-09-26 | End: 2024-09-25

## 2023-09-25 RX ORDER — HYDROCODONE BITARTRATE AND ACETAMINOPHEN 5; 325 MG/1; MG/1
1 TABLET ORAL EVERY 8 HOURS PRN
Qty: 20 TABLET | Refills: 0 | Status: ON HOLD | OUTPATIENT
Start: 2023-09-25 | End: 2023-11-01

## 2023-09-25 RX ORDER — FUROSEMIDE 20 MG/1
20 TABLET ORAL DAILY
Status: DISCONTINUED | OUTPATIENT
Start: 2023-09-26 | End: 2023-09-25 | Stop reason: HOSPADM

## 2023-09-25 RX ORDER — POTASSIUM CHLORIDE 750 MG/1
10 TABLET, EXTENDED RELEASE ORAL DAILY
Qty: 10 TABLET | Refills: 0 | Status: SHIPPED | OUTPATIENT
Start: 2023-09-25 | End: 2023-10-05

## 2023-09-25 RX ORDER — METOPROLOL SUCCINATE 25 MG/1
25 TABLET, EXTENDED RELEASE ORAL DAILY
Qty: 30 TABLET | Refills: 0 | Status: ON HOLD | OUTPATIENT
Start: 2023-09-26 | End: 2023-11-01

## 2023-09-25 RX ORDER — AMIODARONE HYDROCHLORIDE 200 MG/1
200 TABLET ORAL DAILY
Qty: 30 TABLET | Refills: 0 | Status: ON HOLD | OUTPATIENT
Start: 2023-09-25 | End: 2023-11-01

## 2023-09-25 RX ORDER — ASPIRIN 81 MG/1
81 TABLET ORAL DAILY
Qty: 30 TABLET | Refills: 0 | Status: SHIPPED | OUTPATIENT
Start: 2023-09-26

## 2023-09-25 RX ORDER — PANTOPRAZOLE SODIUM 40 MG/1
40 TABLET, DELAYED RELEASE ORAL
Qty: 30 TABLET | Refills: 0 | Status: ON HOLD | OUTPATIENT
Start: 2023-09-26 | End: 2023-11-01

## 2023-09-25 RX ORDER — LISINOPRIL 5 MG/1
5 TABLET ORAL DAILY
Status: DISCONTINUED | OUTPATIENT
Start: 2023-09-25 | End: 2023-09-25 | Stop reason: HOSPADM

## 2023-09-25 RX ORDER — INSULIN GLARGINE 100 [IU]/ML
15 INJECTION, SOLUTION SUBCUTANEOUS DAILY
Status: DISCONTINUED | OUTPATIENT
Start: 2023-09-26 | End: 2023-09-25 | Stop reason: HOSPADM

## 2023-09-25 RX ORDER — POTASSIUM CHLORIDE 20 MEQ/1
40 TABLET, EXTENDED RELEASE ORAL ONCE
Status: COMPLETED | OUTPATIENT
Start: 2023-09-25 | End: 2023-09-25

## 2023-09-25 RX ADMIN — INSULIN LISPRO 3 UNITS: 100 INJECTION, SOLUTION INTRAVENOUS; SUBCUTANEOUS at 13:16

## 2023-09-25 RX ADMIN — ACETAMINOPHEN 1000 MG: 500 TABLET ORAL at 06:38

## 2023-09-25 RX ADMIN — FUROSEMIDE 40 MG: 10 INJECTION, SOLUTION INTRAMUSCULAR; INTRAVENOUS at 09:49

## 2023-09-25 RX ADMIN — PANTOPRAZOLE SODIUM 40 MG: 40 TABLET, DELAYED RELEASE ORAL at 06:38

## 2023-09-25 RX ADMIN — INSULIN GLARGINE 12 UNITS: 100 INJECTION, SOLUTION SUBCUTANEOUS at 09:49

## 2023-09-25 RX ADMIN — INSULIN LISPRO 4 UNITS: 100 INJECTION, SOLUTION INTRAVENOUS; SUBCUTANEOUS at 09:49

## 2023-09-25 RX ADMIN — LEVOTHYROXINE SODIUM 50 MCG: 0.05 TABLET ORAL at 06:38

## 2023-09-25 RX ADMIN — CLOPIDOGREL BISULFATE 75 MG: 75 TABLET, FILM COATED ORAL at 09:49

## 2023-09-25 RX ADMIN — INSULIN LISPRO 4 UNITS: 100 INJECTION, SOLUTION INTRAVENOUS; SUBCUTANEOUS at 13:16

## 2023-09-25 RX ADMIN — HEPARIN SODIUM 5000 UNITS: 5000 INJECTION INTRAVENOUS; SUBCUTANEOUS at 06:40

## 2023-09-25 RX ADMIN — INSULIN LISPRO 3 UNITS: 100 INJECTION, SOLUTION INTRAVENOUS; SUBCUTANEOUS at 09:53

## 2023-09-25 RX ADMIN — LISINOPRIL 5 MG: 5 TABLET ORAL at 13:16

## 2023-09-25 RX ADMIN — POTASSIUM CHLORIDE 40 MEQ: 1500 TABLET, EXTENDED RELEASE ORAL at 09:49

## 2023-09-25 RX ADMIN — METOPROLOL SUCCINATE 25 MG: 25 TABLET, EXTENDED RELEASE ORAL at 13:16

## 2023-09-25 RX ADMIN — AMIODARONE HYDROCHLORIDE 200 MG: 200 TABLET ORAL at 09:49

## 2023-09-25 RX ADMIN — ASPIRIN 81 MG: 81 TABLET, COATED ORAL at 09:49

## 2023-09-25 RX ADMIN — SENNOSIDES AND DOCUSATE SODIUM 2 TABLET: 50; 8.6 TABLET ORAL at 09:49

## 2023-09-25 ASSESSMENT — COGNITIVE AND FUNCTIONAL STATUS - GENERAL
DAILY_ACTIVITY_RAW_SCORE: 24
BASIC_MOBILITY_CONVERTED_SCORE: 41.05
BASIC_MOBILITY_RAW_SCORE: 18
DAILY_ACTIVITY_CONVERTED_SCORE: 57.54

## 2023-09-25 ASSESSMENT — ACTIVITIES OF DAILY LIVING (ADL): HOME_MANAGEMENT_TIME_ENTRY: 25

## 2023-09-25 ASSESSMENT — PAIN SCALES - GENERAL: PAINLEVEL_OUTOF10: 0

## 2023-09-26 LAB
ATRIAL RATE (BPM): 72
P AXIS (DEGREES): 65
PR-INTERVAL (MSEC): 134
QRS-INTERVAL (MSEC): 92
QT-INTERVAL (MSEC): 452
QTC: 495
R AXIS (DEGREES): -22
REPORT TEXT: NORMAL
T AXIS (DEGREES): 18
VENTRICULAR RATE EKG/MIN (BPM): 72

## 2023-10-09 ENCOUNTER — APPOINTMENT (OUTPATIENT)
Dept: CARDIAC REHAB | Age: 63
End: 2023-10-09

## 2023-10-18 ENCOUNTER — TELEPHONE (OUTPATIENT)
Dept: CARDIAC REHAB | Age: 63
End: 2023-10-18

## 2023-10-19 ENCOUNTER — HOSPITAL ENCOUNTER (OUTPATIENT)
Dept: CARDIAC REHAB | Age: 63
Discharge: STILL ADMITTED OR ADMINISTRATIVE | End: 2023-10-19
Attending: INTERNAL MEDICINE

## 2023-10-19 VITALS — BODY MASS INDEX: 23.13 KG/M2 | HEIGHT: 66 IN

## 2023-10-19 DIAGNOSIS — Z95.1 S/P CABG (CORONARY ARTERY BYPASS GRAFT): ICD-10-CM

## 2023-10-19 PROCEDURE — 93798 PHYS/QHP OP CAR RHAB W/ECG: CPT

## 2023-10-19 ASSESSMENT — PATIENT HEALTH QUESTIONNAIRE - PHQ9
3. TROUBLE FALLING OR STAYING ASLEEP OR SLEEPING TOO MUCH: MORE THAN HALF THE DAYS
1. LITTLE INTEREST OR PLEASURE IN DOING THINGS: NOT AT ALL
2. FEELING DOWN, DEPRESSED OR HOPELESS: NOT AT ALL
4. FEELING TIRED OR HAVING LITTLE ENERGY: SEVERAL DAYS
10. IF YOU CHECKED OFF ANY PROBLEMS, HOW DIFFICULT HAVE THESE PROBLEMS MADE IT FOR YOU TO DO YOUR WORK, TAKE CARE OF THINGS AT HOME, OR GET ALONG WITH OTHER PEOPLE: NOT DIFFICULT AT ALL
SUM OF ALL RESPONSES TO PHQ QUESTIONS 1-9: 3
6. FEELING BAD ABOUT YOURSELF - OR THAT YOU ARE A FAILURE OR HAVE LET YOURSELF OR YOUR FAMILY DOWN: NOT AT ALL
5. POOR APPETITE OR OVEREATING: NOT AT ALL
8. MOVING OR SPEAKING SO SLOWLY THAT OTHER PEOPLE COULD HAVE NOTICED. OR THE OPPOSITE, BEING SO FIGETY OR RESTLESS THAT YOU HAVE BEEN MOVING AROUND A LOT MORE THAN USUAL: NOT AT ALL
9. THOUGHTS THAT YOU WOULD BE BETTER OFF DEAD, OR OF HURTING YOURSELF: NOT AT ALL
7. TROUBLE CONCENTRATING ON THINGS, SUCH AS READING THE NEWSPAPER OR WATCHING TELEVISION: NOT AT ALL

## 2023-10-19 ASSESSMENT — EJECTION FRACTION: LVEF_VALUE: 60

## 2023-10-19 ASSESSMENT — 6 MINUTE WALK TEST (6MWT): DID PATIENT PARTICIPATE IN 6 MINUTE WALK TEST: YES

## 2023-10-23 ENCOUNTER — HOSPITAL ENCOUNTER (OUTPATIENT)
Dept: CARDIAC REHAB | Age: 63
Discharge: STILL ADMITTED OR ADMINISTRATIVE | End: 2023-10-23
Attending: INTERNAL MEDICINE

## 2023-10-23 PROCEDURE — 93798 PHYS/QHP OP CAR RHAB W/ECG: CPT

## 2023-10-25 ENCOUNTER — HOSPITAL ENCOUNTER (OUTPATIENT)
Dept: CARDIAC REHAB | Age: 63
Discharge: STILL ADMITTED OR ADMINISTRATIVE | End: 2023-10-25
Attending: INTERNAL MEDICINE

## 2023-10-25 PROCEDURE — 93798 PHYS/QHP OP CAR RHAB W/ECG: CPT

## 2023-10-26 ENCOUNTER — HOSPITAL ENCOUNTER (OUTPATIENT)
Dept: CARDIAC REHAB | Age: 63
Discharge: STILL ADMITTED OR ADMINISTRATIVE | End: 2023-10-26

## 2023-10-26 PROCEDURE — 93798 PHYS/QHP OP CAR RHAB W/ECG: CPT

## 2023-10-30 ENCOUNTER — HOSPITAL ENCOUNTER (OUTPATIENT)
Dept: CARDIAC REHAB | Age: 63
Discharge: HOME | End: 2023-10-30

## 2023-10-31 ENCOUNTER — HOSPITAL ENCOUNTER (OUTPATIENT)
Age: 63
Setting detail: OBSERVATION
Discharge: HOME | End: 2023-11-02
Attending: EMERGENCY MEDICINE | Admitting: INTERNAL MEDICINE

## 2023-10-31 ENCOUNTER — APPOINTMENT (OUTPATIENT)
Dept: GENERAL RADIOLOGY | Age: 63
End: 2023-10-31
Attending: EMERGENCY MEDICINE

## 2023-10-31 DIAGNOSIS — E87.1 HYPONATREMIA: Primary | ICD-10-CM

## 2023-10-31 DIAGNOSIS — M13.0 POLYARTHRITIS: ICD-10-CM

## 2023-10-31 DIAGNOSIS — R01.1 HEART MURMUR: ICD-10-CM

## 2023-10-31 LAB
ALBUMIN SERPL-MCNC: 3 G/DL (ref 3.6–5.1)
ALBUMIN/GLOB SERPL: 0.7 {RATIO} (ref 1–2.4)
ALP SERPL-CCNC: 91 UNITS/L (ref 45–117)
ALT SERPL-CCNC: 23 UNITS/L
ANION GAP SERPL CALC-SCNC: 10 MMOL/L (ref 7–19)
AST SERPL-CCNC: 12 UNITS/L
BASOPHILS # BLD: 0.1 K/MCL (ref 0–0.3)
BASOPHILS NFR BLD: 1 %
BILIRUB SERPL-MCNC: 0.7 MG/DL (ref 0.2–1)
BUN SERPL-MCNC: 13 MG/DL (ref 6–20)
BUN/CREAT SERPL: 15 (ref 7–25)
CALCIUM SERPL-MCNC: 9.3 MG/DL (ref 8.4–10.2)
CHLORIDE SERPL-SCNC: 97 MMOL/L (ref 97–110)
CK SERPL-CCNC: 62 UNITS/L (ref 39–308)
CO2 SERPL-SCNC: 27 MMOL/L (ref 21–32)
CREAT SERPL-MCNC: 0.85 MG/DL (ref 0.67–1.17)
CRP SERPL-MCNC: 9.1 MG/DL
DEPRECATED RDW RBC: 44.5 FL (ref 39–50)
EGFRCR SERPLBLD CKD-EPI 2021: >90 ML/MIN/{1.73_M2}
EOSINOPHIL # BLD: 0 K/MCL (ref 0–0.5)
EOSINOPHIL NFR BLD: 0 %
ERYTHROCYTE [DISTWIDTH] IN BLOOD: 13.2 % (ref 11–15)
ERYTHROCYTE [SEDIMENTATION RATE] IN BLOOD BY WESTERGREN METHOD: 82 MM/HR (ref 0–20)
FASTING DURATION TIME PATIENT: ABNORMAL H
FLUAV RNA RESP QL NAA+PROBE: NOT DETECTED
FLUBV RNA RESP QL NAA+PROBE: NOT DETECTED
GLOBULIN SER-MCNC: 4.4 G/DL (ref 2–4)
GLUCOSE BLDC GLUCOMTR-MCNC: 142 MG/DL (ref 70–99)
GLUCOSE SERPL-MCNC: 146 MG/DL (ref 70–99)
HCT VFR BLD CALC: 35.1 % (ref 39–51)
HGB BLD-MCNC: 11.4 G/DL (ref 13–17)
IMM GRANULOCYTES # BLD AUTO: 0.1 K/MCL (ref 0–0.2)
IMM GRANULOCYTES # BLD: 1 %
LYMPHOCYTES # BLD: 0.6 K/MCL (ref 1–4)
LYMPHOCYTES NFR BLD: 6 %
MAGNESIUM SERPL-MCNC: 2.3 MG/DL (ref 1.7–2.4)
MCH RBC QN AUTO: 29.6 PG (ref 26–34)
MCHC RBC AUTO-ENTMCNC: 32.5 G/DL (ref 32–36.5)
MCV RBC AUTO: 91.2 FL (ref 78–100)
MONOCYTES # BLD: 1.2 K/MCL (ref 0.3–0.9)
MONOCYTES NFR BLD: 11 %
NEUTROPHILS # BLD: 8.5 K/MCL (ref 1.8–7.7)
NEUTROPHILS NFR BLD: 81 %
NRBC BLD MANUAL-RTO: 0 /100 WBC
NT-PROBNP SERPL-MCNC: 1205 PG/ML
PLATELET # BLD AUTO: 366 K/MCL (ref 140–450)
POTASSIUM SERPL-SCNC: 4.8 MMOL/L (ref 3.4–5.1)
PROT SERPL-MCNC: 7.4 G/DL (ref 6.4–8.2)
RBC # BLD: 3.85 MIL/MCL (ref 4.5–5.9)
RSV AG NPH QL IA.RAPID: NOT DETECTED
SARS-COV-2 RNA RESP QL NAA+PROBE: NOT DETECTED
SERVICE CMNT-IMP: NORMAL
SERVICE CMNT-IMP: NORMAL
SODIUM SERPL-SCNC: 129 MMOL/L (ref 135–145)
TROPONIN I SERPL DL<=0.01 NG/ML-MCNC: 15 NG/L
WBC # BLD: 10.4 K/MCL (ref 4.2–11)

## 2023-10-31 PROCEDURE — C9803 HOPD COVID-19 SPEC COLLECT: HCPCS

## 2023-10-31 PROCEDURE — 85025 COMPLETE CBC W/AUTO DIFF WBC: CPT | Performed by: EMERGENCY MEDICINE

## 2023-10-31 PROCEDURE — 82962 GLUCOSE BLOOD TEST: CPT

## 2023-10-31 PROCEDURE — 84484 ASSAY OF TROPONIN QUANT: CPT | Performed by: EMERGENCY MEDICINE

## 2023-10-31 PROCEDURE — 0241U COVID/FLU/RSV PANEL: CPT | Performed by: EMERGENCY MEDICINE

## 2023-10-31 PROCEDURE — 83735 ASSAY OF MAGNESIUM: CPT | Performed by: EMERGENCY MEDICINE

## 2023-10-31 PROCEDURE — 71045 X-RAY EXAM CHEST 1 VIEW: CPT

## 2023-10-31 PROCEDURE — 86140 C-REACTIVE PROTEIN: CPT | Performed by: EMERGENCY MEDICINE

## 2023-10-31 PROCEDURE — 86038 ANTINUCLEAR ANTIBODIES: CPT | Performed by: INTERNAL MEDICINE

## 2023-10-31 PROCEDURE — 80053 COMPREHEN METABOLIC PANEL: CPT | Performed by: EMERGENCY MEDICINE

## 2023-10-31 PROCEDURE — 85652 RBC SED RATE AUTOMATED: CPT | Performed by: EMERGENCY MEDICINE

## 2023-10-31 PROCEDURE — G0378 HOSPITAL OBSERVATION PER HR: HCPCS

## 2023-10-31 PROCEDURE — 83880 ASSAY OF NATRIURETIC PEPTIDE: CPT | Performed by: EMERGENCY MEDICINE

## 2023-10-31 PROCEDURE — 87040 BLOOD CULTURE FOR BACTERIA: CPT | Performed by: EMERGENCY MEDICINE

## 2023-10-31 PROCEDURE — 99284 EMERGENCY DEPT VISIT MOD MDM: CPT

## 2023-10-31 PROCEDURE — 82550 ASSAY OF CK (CPK): CPT | Performed by: INTERNAL MEDICINE

## 2023-10-31 PROCEDURE — 93005 ELECTROCARDIOGRAM TRACING: CPT | Performed by: EMERGENCY MEDICINE

## 2023-10-31 PROCEDURE — 36415 COLL VENOUS BLD VENIPUNCTURE: CPT

## 2023-10-31 PROCEDURE — 10004651 HB RX, NO CHARGE ITEM: Performed by: INTERNAL MEDICINE

## 2023-10-31 RX ORDER — MAGNESIUM HYDROXIDE/ALUMINUM HYDROXICE/SIMETHICONE 120; 1200; 1200 MG/30ML; MG/30ML; MG/30ML
30 SUSPENSION ORAL EVERY 4 HOURS PRN
Status: DISCONTINUED | OUTPATIENT
Start: 2023-10-31 | End: 2023-11-02 | Stop reason: HOSPADM

## 2023-10-31 RX ORDER — CLOPIDOGREL BISULFATE 75 MG/1
75 TABLET ORAL DAILY
Status: DISCONTINUED | OUTPATIENT
Start: 2023-11-01 | End: 2023-11-02 | Stop reason: HOSPADM

## 2023-10-31 RX ORDER — HYDROCODONE BITARTRATE AND ACETAMINOPHEN 5; 325 MG/1; MG/1
1 TABLET ORAL EVERY 8 HOURS PRN
Status: DISCONTINUED | OUTPATIENT
Start: 2023-10-31 | End: 2023-11-02 | Stop reason: HOSPADM

## 2023-10-31 RX ORDER — POLYETHYLENE GLYCOL 3350 17 G/17G
17 POWDER, FOR SOLUTION ORAL DAILY PRN
Status: DISCONTINUED | OUTPATIENT
Start: 2023-10-31 | End: 2023-11-02 | Stop reason: HOSPADM

## 2023-10-31 RX ORDER — LISINOPRIL 5 MG/1
5 TABLET ORAL DAILY
Status: DISCONTINUED | OUTPATIENT
Start: 2023-11-01 | End: 2023-11-02 | Stop reason: HOSPADM

## 2023-10-31 RX ORDER — DEXTROSE MONOHYDRATE 25 G/50ML
12.5 INJECTION, SOLUTION INTRAVENOUS PRN
Status: DISCONTINUED | OUTPATIENT
Start: 2023-10-31 | End: 2023-11-02 | Stop reason: HOSPADM

## 2023-10-31 RX ORDER — INSULIN GLARGINE 100 [IU]/ML
10 INJECTION, SOLUTION SUBCUTANEOUS NIGHTLY
Status: DISCONTINUED | OUTPATIENT
Start: 2023-11-01 | End: 2023-11-01

## 2023-10-31 RX ORDER — PANTOPRAZOLE SODIUM 40 MG/1
40 TABLET, DELAYED RELEASE ORAL
Status: DISCONTINUED | OUTPATIENT
Start: 2023-11-01 | End: 2023-11-02 | Stop reason: HOSPADM

## 2023-10-31 RX ORDER — ASPIRIN 81 MG/1
81 TABLET ORAL DAILY
Status: DISCONTINUED | OUTPATIENT
Start: 2023-11-01 | End: 2023-11-02 | Stop reason: HOSPADM

## 2023-10-31 RX ORDER — NICOTINE POLACRILEX 4 MG
15 LOZENGE BUCCAL PRN
Status: DISCONTINUED | OUTPATIENT
Start: 2023-10-31 | End: 2023-11-02 | Stop reason: HOSPADM

## 2023-10-31 RX ORDER — ACETAMINOPHEN 500 MG
1000 TABLET ORAL EVERY 6 HOURS PRN
Status: DISCONTINUED | OUTPATIENT
Start: 2023-10-31 | End: 2023-11-02 | Stop reason: HOSPADM

## 2023-10-31 RX ORDER — NICOTINE POLACRILEX 4 MG
30 LOZENGE BUCCAL PRN
Status: DISCONTINUED | OUTPATIENT
Start: 2023-10-31 | End: 2023-11-02 | Stop reason: HOSPADM

## 2023-10-31 RX ORDER — ACETAMINOPHEN 650 MG/1
650 SUPPOSITORY RECTAL EVERY 4 HOURS PRN
Status: DISCONTINUED | OUTPATIENT
Start: 2023-10-31 | End: 2023-10-31

## 2023-10-31 RX ORDER — ENOXAPARIN SODIUM 100 MG/ML
40 INJECTION SUBCUTANEOUS DAILY
Status: DISCONTINUED | OUTPATIENT
Start: 2023-11-01 | End: 2023-11-02 | Stop reason: HOSPADM

## 2023-10-31 RX ORDER — CYCLOBENZAPRINE HCL 10 MG
10 TABLET ORAL 3 TIMES DAILY PRN
Status: DISCONTINUED | OUTPATIENT
Start: 2023-10-31 | End: 2023-11-02 | Stop reason: HOSPADM

## 2023-10-31 RX ORDER — ACETAMINOPHEN 325 MG/1
650 TABLET ORAL EVERY 4 HOURS PRN
Status: DISCONTINUED | OUTPATIENT
Start: 2023-10-31 | End: 2023-10-31

## 2023-10-31 RX ORDER — LEVOTHYROXINE SODIUM 0.05 MG/1
50 TABLET ORAL DAILY
Status: DISCONTINUED | OUTPATIENT
Start: 2023-11-01 | End: 2023-11-01

## 2023-10-31 RX ORDER — ROSUVASTATIN CALCIUM 20 MG/1
20 TABLET, COATED ORAL DAILY
Status: DISCONTINUED | OUTPATIENT
Start: 2023-11-01 | End: 2023-11-02 | Stop reason: HOSPADM

## 2023-10-31 RX ORDER — METOPROLOL SUCCINATE 25 MG/1
25 TABLET, EXTENDED RELEASE ORAL DAILY
Status: DISCONTINUED | OUTPATIENT
Start: 2023-11-01 | End: 2023-11-02 | Stop reason: HOSPADM

## 2023-10-31 RX ORDER — ONDANSETRON 2 MG/ML
4 INJECTION INTRAMUSCULAR; INTRAVENOUS EVERY 12 HOURS PRN
Status: DISCONTINUED | OUTPATIENT
Start: 2023-10-31 | End: 2023-11-02 | Stop reason: HOSPADM

## 2023-10-31 RX ORDER — LIDOCAINE 4 G/G
1 PATCH TOPICAL DAILY
Status: DISCONTINUED | OUTPATIENT
Start: 2023-11-01 | End: 2023-11-02 | Stop reason: HOSPADM

## 2023-10-31 RX ORDER — ONDANSETRON 4 MG/1
4 TABLET, ORALLY DISINTEGRATING ORAL EVERY 12 HOURS PRN
Status: DISCONTINUED | OUTPATIENT
Start: 2023-10-31 | End: 2023-11-02 | Stop reason: HOSPADM

## 2023-10-31 RX ORDER — DEXTROSE MONOHYDRATE 25 G/50ML
25 INJECTION, SOLUTION INTRAVENOUS PRN
Status: DISCONTINUED | OUTPATIENT
Start: 2023-10-31 | End: 2023-11-02 | Stop reason: HOSPADM

## 2023-10-31 RX ORDER — ACETAMINOPHEN 650 MG/1
650 SUPPOSITORY RECTAL EVERY 4 HOURS PRN
Status: DISCONTINUED | OUTPATIENT
Start: 2023-10-31 | End: 2023-11-02 | Stop reason: HOSPADM

## 2023-10-31 RX ORDER — 0.9 % SODIUM CHLORIDE 0.9 %
2 VIAL (ML) INJECTION EVERY 12 HOURS SCHEDULED
Status: DISCONTINUED | OUTPATIENT
Start: 2023-10-31 | End: 2023-11-02 | Stop reason: HOSPADM

## 2023-10-31 RX ADMIN — SODIUM CHLORIDE, PRESERVATIVE FREE 2 ML: 5 INJECTION INTRAVENOUS at 21:00

## 2023-10-31 SDOH — ECONOMIC STABILITY: FOOD INSECURITY: HOW OFTEN IN THE PAST 12 MONTHS WERE YOU WORRIED OR STRESSED ABOUT HAVING ENOUGH MONEY TO BUY NUTRITIOUS MEALS?: NEVER

## 2023-10-31 SDOH — SOCIAL STABILITY: SOCIAL NETWORK: SUPPORT SYSTEMS: SPOUSE;CHILDREN

## 2023-10-31 SDOH — HEALTH STABILITY: GENERAL: BECAUSE OF A PHYSICAL, MENTAL, OR EMOTIONAL CONDITION, DO YOU HAVE DIFFICULTY DOING ERRANDS ALONE?: NO

## 2023-10-31 SDOH — ECONOMIC STABILITY: GENERAL

## 2023-10-31 SDOH — ECONOMIC STABILITY: HOUSING INSECURITY: WHAT IS YOUR LIVING SITUATION TODAY?: HOUSE

## 2023-10-31 SDOH — ECONOMIC STABILITY: HOUSING INSECURITY: ARE YOU WORRIED ABOUT LOSING YOUR HOUSING?: NO

## 2023-10-31 SDOH — HEALTH STABILITY: PHYSICAL HEALTH: DO YOU HAVE SERIOUS DIFFICULTY WALKING OR CLIMBING STAIRS?: YES

## 2023-10-31 SDOH — ECONOMIC STABILITY: HOUSING INSECURITY: WHAT IS YOUR LIVING SITUATION TODAY?: SPOUSE;ADULT CHILDREN

## 2023-10-31 SDOH — HEALTH STABILITY: PHYSICAL HEALTH: DO YOU HAVE DIFFICULTY DRESSING OR BATHING?: NO

## 2023-10-31 ASSESSMENT — PATIENT HEALTH QUESTIONNAIRE - PHQ9
SUM OF ALL RESPONSES TO PHQ9 QUESTIONS 1 AND 2: 0
1. LITTLE INTEREST OR PLEASURE IN DOING THINGS: NOT AT ALL
2. FEELING DOWN, DEPRESSED OR HOPELESS: NOT AT ALL
SUM OF ALL RESPONSES TO PHQ9 QUESTIONS 1 AND 2: 0
IS PATIENT ABLE TO COMPLETE PHQ2 OR PHQ9: YES
CLINICAL INTERPRETATION OF PHQ2 SCORE: NO FURTHER SCREENING NEEDED

## 2023-10-31 ASSESSMENT — COLUMBIA-SUICIDE SEVERITY RATING SCALE - C-SSRS
1. WITHIN THE PAST MONTH, HAVE YOU WISHED YOU WERE DEAD OR WISHED YOU COULD GO TO SLEEP AND NOT WAKE UP?: NO
6. HAVE YOU EVER DONE ANYTHING, STARTED TO DO ANYTHING, OR PREPARED TO DO ANYTHING TO END YOUR LIFE?: NO
2. HAVE YOU ACTUALLY HAD ANY THOUGHTS OF KILLING YOURSELF?: NO

## 2023-10-31 ASSESSMENT — ACTIVITIES OF DAILY LIVING (ADL)
ADL_SCORE: 12
ADL_BEFORE_ADMISSION: INDEPENDENT
ADL_SHORT_OF_BREATH: NO
RECENT_DECLINE_ADL: NO

## 2023-10-31 ASSESSMENT — PAIN SCALES - GENERAL
PAINLEVEL_OUTOF10: 2
PAINLEVEL_OUTOF10: 2

## 2023-10-31 ASSESSMENT — LIFESTYLE VARIABLES: HOW OFTEN DO YOU HAVE A DRINK CONTAINING ALCOHOL: NEVER

## 2023-11-01 ENCOUNTER — APPOINTMENT (OUTPATIENT)
Dept: CARDIOLOGY | Age: 63
End: 2023-11-01
Attending: INTERNAL MEDICINE

## 2023-11-01 ENCOUNTER — APPOINTMENT (OUTPATIENT)
Dept: CARDIAC REHAB | Age: 63
End: 2023-11-01
Attending: INTERNAL MEDICINE

## 2023-11-01 LAB
ANA SER QL IA: NEGATIVE
ANION GAP SERPL CALC-SCNC: 9 MMOL/L (ref 7–19)
ANNOTATION COMMENT IMP: NORMAL
AORTIC VALVE AREA (AVA): 1.02
AORTIC VALVE AREA: 2.07
ATRIAL RATE (BPM): 88
AV MEAN GRADIENT (AVMG): 4
AV MEAN VELOCITY (AVMV): 0.88
AV PEAK GRADIENT (AVPG): 9
AV PEAK VELOCITY (AVPV): 1.53
AV STENOSIS SEVERITY TEXT: NORMAL
AVI LVOT PEAK GRADIENT (LVOTMG): 1
BUN SERPL-MCNC: 11 MG/DL (ref 6–20)
BUN/CREAT SERPL: 14 (ref 7–25)
CALCIUM SERPL-MCNC: 9 MG/DL (ref 8.4–10.2)
CHLORIDE SERPL-SCNC: 101 MMOL/L (ref 97–110)
CO2 SERPL-SCNC: 26 MMOL/L (ref 21–32)
CREAT SERPL-MCNC: 0.81 MG/DL (ref 0.67–1.17)
DEPRECATED RDW RBC: 43.5 FL (ref 39–50)
E WAVE DECELARATION TIME (MDT): 20.82
EGFRCR SERPLBLD CKD-EPI 2021: >90 ML/MIN/{1.73_M2}
ERYTHROCYTE [DISTWIDTH] IN BLOOD: 13.2 % (ref 11–15)
FASTING DURATION TIME PATIENT: ABNORMAL H
GLUCOSE BLDC GLUCOMTR-MCNC: 107 MG/DL (ref 70–99)
GLUCOSE BLDC GLUCOMTR-MCNC: 114 MG/DL (ref 70–99)
GLUCOSE BLDC GLUCOMTR-MCNC: 120 MG/DL (ref 70–99)
GLUCOSE BLDC GLUCOMTR-MCNC: 157 MG/DL (ref 70–99)
GLUCOSE BLDC GLUCOMTR-MCNC: 185 MG/DL (ref 70–99)
GLUCOSE BLDC GLUCOMTR-MCNC: 89 MG/DL (ref 70–99)
GLUCOSE SERPL-MCNC: 112 MG/DL (ref 70–99)
HAV IGM SER QL: NEGATIVE
HBV CORE IGM SER QL: NEGATIVE
HBV SURFACE AG SER QL: NEGATIVE
HCT VFR BLD CALC: 33.8 % (ref 39–51)
HCV AB SER QL: NEGATIVE
HGB BLD-MCNC: 11 G/DL (ref 13–17)
IMP & REVIEW OF LAB RESULTS: NORMAL
INTERVENTRICULAR SEPTUM IN END DIASTOLE (IVSD): 1.63
LEFT INTERNAL DIMENSION IN SYSTOLE (LVSD): 1.1
LEFT VENTRICULAR INTERNAL DIMENSION IN DIASTOLE (LVDD): 2.7
LEFT VENTRICULAR POSTERIOR WALL IN END DIASTOLE (LVPW): 4.1
LV EF: NORMAL %
LVOT 2D (LVOTD): 21.6
LVOT VTI (LVOTVTI): 1.16
MCH RBC QN AUTO: 29.1 PG (ref 26–34)
MCHC RBC AUTO-ENTMCNC: 32.5 G/DL (ref 32–36.5)
MCV RBC AUTO: 89.4 FL (ref 78–100)
MV E TISSUE VEL MED (MESV): 8.49
MV E WAVE VEL/E TISSUE VEL MED(MSR): 4.9
MV PEAK A VELOCITY (MVPAV): 218
MV PEAK E VELOCITY (MVPEV): 0.84
NRBC BLD MANUAL-RTO: 0 /100 WBC
P AXIS (DEGREES): 74
PLATELET # BLD AUTO: 348 K/MCL (ref 140–450)
POTASSIUM SERPL-SCNC: 4.4 MMOL/L (ref 3.4–5.1)
PR-INTERVAL (MSEC): 140
QRS-INTERVAL (MSEC): 90
QT-INTERVAL (MSEC): 384
QTC: 465
R AXIS (DEGREES): -11
RAINBOW EXTRA TUBES HOLD SPECIMEN: NORMAL
RBC # BLD: 3.78 MIL/MCL (ref 4.5–5.9)
REPORT TEXT: NORMAL
RV END SYSTOLIC LONGITUDINAL STRAIN FREE WALL (RVGS): 1.9
SODIUM SERPL-SCNC: 132 MMOL/L (ref 135–145)
T AXIS (DEGREES): 95
TRICUSPID VALVE PEAK REGURGITATION VELOCITY (TRPV): 2.8
TV ESTIMATED RIGHT ARTERIAL PRESSURE (RAP): 6.09
VENTRICULAR RATE EKG/MIN (BPM): 88
WBC # BLD: 8.2 K/MCL (ref 4.2–11)

## 2023-11-01 PROCEDURE — 96372 THER/PROPH/DIAG INJ SC/IM: CPT | Performed by: INTERNAL MEDICINE

## 2023-11-01 PROCEDURE — 86747 PARVOVIRUS ANTIBODY: CPT | Performed by: HOSPITALIST

## 2023-11-01 PROCEDURE — 86200 CCP ANTIBODY: CPT | Performed by: HOSPITALIST

## 2023-11-01 PROCEDURE — 10004651 HB RX, NO CHARGE ITEM: Performed by: INTERNAL MEDICINE

## 2023-11-01 PROCEDURE — 10002800 HB RX 250 W HCPCS: Performed by: INTERNAL MEDICINE

## 2023-11-01 PROCEDURE — 96372 THER/PROPH/DIAG INJ SC/IM: CPT | Performed by: HOSPITALIST

## 2023-11-01 PROCEDURE — 36415 COLL VENOUS BLD VENIPUNCTURE: CPT | Performed by: INTERNAL MEDICINE

## 2023-11-01 PROCEDURE — 85027 COMPLETE CBC AUTOMATED: CPT | Performed by: INTERNAL MEDICINE

## 2023-11-01 PROCEDURE — G0378 HOSPITAL OBSERVATION PER HR: HCPCS

## 2023-11-01 PROCEDURE — 80074 ACUTE HEPATITIS PANEL: CPT | Performed by: HOSPITALIST

## 2023-11-01 PROCEDURE — 10002803 HB RX 637: Performed by: INTERNAL MEDICINE

## 2023-11-01 PROCEDURE — 10002800 HB RX 250 W HCPCS: Performed by: HOSPITALIST

## 2023-11-01 PROCEDURE — 80048 BASIC METABOLIC PNL TOTAL CA: CPT | Performed by: INTERNAL MEDICINE

## 2023-11-01 PROCEDURE — 82962 GLUCOSE BLOOD TEST: CPT

## 2023-11-01 PROCEDURE — 93306 TTE W/DOPPLER COMPLETE: CPT

## 2023-11-01 RX ORDER — INSULIN LISPRO 100 [IU]/ML
6 INJECTION, SOLUTION INTRAVENOUS; SUBCUTANEOUS ONCE
Status: COMPLETED | OUTPATIENT
Start: 2023-11-01 | End: 2023-11-01

## 2023-11-01 RX ORDER — INSULIN GLARGINE 100 [IU]/ML
15 INJECTION, SOLUTION SUBCUTANEOUS NIGHTLY
Status: DISCONTINUED | OUTPATIENT
Start: 2023-11-01 | End: 2023-11-02 | Stop reason: HOSPADM

## 2023-11-01 RX ORDER — METOPROLOL SUCCINATE 25 MG/1
25 TABLET, EXTENDED RELEASE ORAL DAILY
Qty: 30 TABLET | Refills: 0 | Status: SHIPPED | OUTPATIENT
Start: 2023-11-01

## 2023-11-01 RX ORDER — LEVOTHYROXINE SODIUM 0.07 MG/1
75 TABLET ORAL
Status: DISCONTINUED | OUTPATIENT
Start: 2023-11-02 | End: 2023-11-02 | Stop reason: HOSPADM

## 2023-11-01 RX ORDER — LEVOTHYROXINE SODIUM 0.07 MG/1
75 TABLET ORAL DAILY
COMMUNITY

## 2023-11-01 RX ORDER — INSULIN GLARGINE 100 [IU]/ML
12 INJECTION, SOLUTION SUBCUTANEOUS NIGHTLY
Status: DISCONTINUED | OUTPATIENT
Start: 2023-11-01 | End: 2023-11-01

## 2023-11-01 RX ORDER — INSULIN LISPRO 100 [IU]/ML
5 INJECTION, SOLUTION INTRAVENOUS; SUBCUTANEOUS ONCE
Status: COMPLETED | OUTPATIENT
Start: 2023-11-01 | End: 2023-11-01

## 2023-11-01 RX ORDER — INSULIN GLARGINE 100 [IU]/ML
15 INJECTION, SOLUTION SUBCUTANEOUS NIGHTLY
Status: DISCONTINUED | OUTPATIENT
Start: 2023-11-02 | End: 2023-11-01

## 2023-11-01 RX ORDER — INSULIN LISPRO 100 [IU]/ML
4 INJECTION, SOLUTION INTRAVENOUS; SUBCUTANEOUS
Status: DISCONTINUED | OUTPATIENT
Start: 2023-11-01 | End: 2023-11-01

## 2023-11-01 RX ORDER — LISINOPRIL 5 MG/1
5 TABLET ORAL DAILY
Qty: 30 TABLET | Refills: 0 | Status: SHIPPED | OUTPATIENT
Start: 2023-11-01

## 2023-11-01 RX ADMIN — INSULIN LISPRO 5 UNITS: 100 INJECTION, SOLUTION INTRAVENOUS; SUBCUTANEOUS at 17:14

## 2023-11-01 RX ADMIN — SODIUM CHLORIDE, PRESERVATIVE FREE 2 ML: 5 INJECTION INTRAVENOUS at 09:16

## 2023-11-01 RX ADMIN — ACETAMINOPHEN 1000 MG: 500 TABLET ORAL at 06:28

## 2023-11-01 RX ADMIN — ACETAMINOPHEN 1000 MG: 500 TABLET ORAL at 00:00

## 2023-11-01 RX ADMIN — ACETAMINOPHEN 1000 MG: 500 TABLET ORAL at 13:38

## 2023-11-01 RX ADMIN — ASPIRIN 81 MG: 81 TABLET, COATED ORAL at 09:07

## 2023-11-01 RX ADMIN — INSULIN LISPRO 6 UNITS: 100 INJECTION, SOLUTION INTRAVENOUS; SUBCUTANEOUS at 11:20

## 2023-11-01 RX ADMIN — SODIUM CHLORIDE, PRESERVATIVE FREE 2 ML: 5 INJECTION INTRAVENOUS at 20:46

## 2023-11-01 RX ADMIN — ENOXAPARIN SODIUM 40 MG: 40 INJECTION SUBCUTANEOUS at 09:15

## 2023-11-01 RX ADMIN — INSULIN GLARGINE 15 UNITS: 100 INJECTION, SOLUTION SUBCUTANEOUS at 23:32

## 2023-11-01 RX ADMIN — ACETAMINOPHEN 1000 MG: 500 TABLET ORAL at 20:45

## 2023-11-01 RX ADMIN — ROSUVASTATIN CALCIUM 20 MG: 20 TABLET, FILM COATED ORAL at 09:07

## 2023-11-01 RX ADMIN — CLOPIDOGREL BISULFATE 75 MG: 75 TABLET, FILM COATED ORAL at 09:07

## 2023-11-01 RX ADMIN — METOPROLOL SUCCINATE 25 MG: 25 TABLET, EXTENDED RELEASE ORAL at 09:07

## 2023-11-01 RX ADMIN — LIDOCAINE 1 PATCH: 4 PATCH TOPICAL at 09:09

## 2023-11-01 RX ADMIN — LISINOPRIL 5 MG: 5 TABLET ORAL at 09:07

## 2023-11-01 ASSESSMENT — PAIN SCALES - GENERAL
PAINLEVEL_OUTOF10: 0
PAINLEVEL_OUTOF10: 2
PAINLEVEL_OUTOF10: 0
PAINLEVEL_OUTOF10: 6
PAINLEVEL_OUTOF10: 0
PAINLEVEL_OUTOF10: 3
PAINLEVEL_OUTOF10: 7
PAINLEVEL_OUTOF10: 4

## 2023-11-02 ENCOUNTER — APPOINTMENT (OUTPATIENT)
Dept: CARDIAC REHAB | Age: 63
End: 2023-11-02

## 2023-11-02 VITALS
OXYGEN SATURATION: 99 % | WEIGHT: 138.23 LBS | TEMPERATURE: 97.5 F | RESPIRATION RATE: 16 BRPM | DIASTOLIC BLOOD PRESSURE: 70 MMHG | HEART RATE: 72 BPM | BODY MASS INDEX: 22.22 KG/M2 | SYSTOLIC BLOOD PRESSURE: 118 MMHG | HEIGHT: 66 IN

## 2023-11-02 LAB
ANION GAP SERPL CALC-SCNC: 12 MMOL/L (ref 7–19)
B19V IGG SER IA-ACNC: 2.7 IV
B19V IGM SER IA-ACNC: 0.1 IV
BUN SERPL-MCNC: 16 MG/DL (ref 6–20)
BUN/CREAT SERPL: 19 (ref 7–25)
CALCIUM SERPL-MCNC: 9.2 MG/DL (ref 8.4–10.2)
CCP AB SER IA-ACNC: 4 UNITS
CHLORIDE SERPL-SCNC: 99 MMOL/L (ref 97–110)
CO2 SERPL-SCNC: 26 MMOL/L (ref 21–32)
CREAT SERPL-MCNC: 0.86 MG/DL (ref 0.67–1.17)
DEPRECATED RDW RBC: 44.1 FL (ref 39–50)
EGFRCR SERPLBLD CKD-EPI 2021: >90 ML/MIN/{1.73_M2}
ERYTHROCYTE [DISTWIDTH] IN BLOOD: 13.3 % (ref 11–15)
FASTING DURATION TIME PATIENT: ABNORMAL H
GLUCOSE BLDC GLUCOMTR-MCNC: 182 MG/DL (ref 70–99)
GLUCOSE SERPL-MCNC: 193 MG/DL (ref 70–99)
HCT VFR BLD CALC: 36.3 % (ref 39–51)
HGB BLD-MCNC: 11.6 G/DL (ref 13–17)
MCH RBC QN AUTO: 28.8 PG (ref 26–34)
MCHC RBC AUTO-ENTMCNC: 32 G/DL (ref 32–36.5)
MCV RBC AUTO: 90.1 FL (ref 78–100)
NRBC BLD MANUAL-RTO: 0 /100 WBC
PLATELET # BLD AUTO: 426 K/MCL (ref 140–450)
POTASSIUM SERPL-SCNC: 4.8 MMOL/L (ref 3.4–5.1)
RBC # BLD: 4.03 MIL/MCL (ref 4.5–5.9)
SODIUM SERPL-SCNC: 132 MMOL/L (ref 135–145)
WBC # BLD: 9 K/MCL (ref 4.2–11)

## 2023-11-02 PROCEDURE — 10002803 HB RX 637: Performed by: INTERNAL MEDICINE

## 2023-11-02 PROCEDURE — G0378 HOSPITAL OBSERVATION PER HR: HCPCS

## 2023-11-02 PROCEDURE — 80048 BASIC METABOLIC PNL TOTAL CA: CPT | Performed by: INTERNAL MEDICINE

## 2023-11-02 PROCEDURE — 10002800 HB RX 250 W HCPCS: Performed by: HOSPITALIST

## 2023-11-02 PROCEDURE — 85027 COMPLETE CBC AUTOMATED: CPT | Performed by: INTERNAL MEDICINE

## 2023-11-02 PROCEDURE — 87798 DETECT AGENT NOS DNA AMP: CPT | Performed by: HOSPITALIST

## 2023-11-02 PROCEDURE — 10004651 HB RX, NO CHARGE ITEM: Performed by: INTERNAL MEDICINE

## 2023-11-02 PROCEDURE — 10002803 HB RX 637: Performed by: HOSPITALIST

## 2023-11-02 PROCEDURE — 96372 THER/PROPH/DIAG INJ SC/IM: CPT | Performed by: HOSPITALIST

## 2023-11-02 PROCEDURE — 36415 COLL VENOUS BLD VENIPUNCTURE: CPT | Performed by: HOSPITALIST

## 2023-11-02 PROCEDURE — 82962 GLUCOSE BLOOD TEST: CPT

## 2023-11-02 RX ORDER — NAPROXEN 250 MG/1
500 TABLET ORAL 2 TIMES DAILY WITH MEALS
Qty: 60 TABLET | Refills: 1 | Status: SHIPPED | OUTPATIENT
Start: 2023-11-02

## 2023-11-02 RX ORDER — PANTOPRAZOLE SODIUM 40 MG/1
40 TABLET, DELAYED RELEASE ORAL
Qty: 30 TABLET | Refills: 0 | Status: SHIPPED | OUTPATIENT
Start: 2023-11-02

## 2023-11-02 RX ORDER — INSULIN LISPRO 100 [IU]/ML
5 INJECTION, SOLUTION INTRAVENOUS; SUBCUTANEOUS ONCE
Status: COMPLETED | OUTPATIENT
Start: 2023-11-02 | End: 2023-11-02

## 2023-11-02 RX ADMIN — CLOPIDOGREL BISULFATE 75 MG: 75 TABLET, FILM COATED ORAL at 09:08

## 2023-11-02 RX ADMIN — METOPROLOL SUCCINATE 25 MG: 25 TABLET, EXTENDED RELEASE ORAL at 09:10

## 2023-11-02 RX ADMIN — SODIUM CHLORIDE, PRESERVATIVE FREE 2 ML: 5 INJECTION INTRAVENOUS at 09:11

## 2023-11-02 RX ADMIN — ASPIRIN 81 MG: 81 TABLET, COATED ORAL at 09:09

## 2023-11-02 RX ADMIN — LEVOTHYROXINE SODIUM 75 MCG: 0.07 TABLET ORAL at 06:25

## 2023-11-02 RX ADMIN — ACETAMINOPHEN 1000 MG: 500 TABLET ORAL at 06:25

## 2023-11-02 RX ADMIN — INSULIN LISPRO 5 UNITS: 100 INJECTION, SOLUTION INTRAVENOUS; SUBCUTANEOUS at 09:08

## 2023-11-02 RX ADMIN — LISINOPRIL 5 MG: 5 TABLET ORAL at 09:09

## 2023-11-02 RX ADMIN — ROSUVASTATIN CALCIUM 20 MG: 20 TABLET, FILM COATED ORAL at 09:08

## 2023-11-02 ASSESSMENT — PAIN SCALES - GENERAL
PAINLEVEL_OUTOF10: 0
PAINLEVEL_OUTOF10: 0
PAINLEVEL_OUTOF10: 3
PAINLEVEL_OUTOF10: 1

## 2023-11-04 LAB
B19V DNA SER QL NAA+PROBE: NOT DETECTED
SPECIMEN SOURCE: NORMAL

## 2023-11-05 LAB
BACTERIA BLD CULT: NORMAL
BACTERIA BLD CULT: NORMAL

## 2023-11-06 ENCOUNTER — HOSPITAL ENCOUNTER (OUTPATIENT)
Dept: CARDIAC REHAB | Age: 63
Discharge: STILL ADMITTED OR ADMINISTRATIVE | End: 2023-11-06

## 2023-11-06 PROCEDURE — 93798 PHYS/QHP OP CAR RHAB W/ECG: CPT

## 2023-11-08 ENCOUNTER — HOSPITAL ENCOUNTER (OUTPATIENT)
Dept: CARDIAC REHAB | Age: 63
Discharge: STILL ADMITTED OR ADMINISTRATIVE | End: 2023-11-08

## 2023-11-08 PROCEDURE — 93798 PHYS/QHP OP CAR RHAB W/ECG: CPT

## 2023-11-09 ENCOUNTER — HOSPITAL ENCOUNTER (OUTPATIENT)
Dept: CARDIAC REHAB | Age: 63
Discharge: STILL ADMITTED OR ADMINISTRATIVE | End: 2023-11-09
Attending: INTERNAL MEDICINE

## 2023-11-09 PROCEDURE — 93798 PHYS/QHP OP CAR RHAB W/ECG: CPT

## 2023-11-13 ENCOUNTER — HOSPITAL ENCOUNTER (OUTPATIENT)
Dept: CARDIAC REHAB | Age: 63
Discharge: STILL ADMITTED OR ADMINISTRATIVE | End: 2023-11-13
Attending: INTERNAL MEDICINE

## 2023-11-13 PROCEDURE — 93798 PHYS/QHP OP CAR RHAB W/ECG: CPT

## 2023-11-15 ENCOUNTER — HOSPITAL ENCOUNTER (OUTPATIENT)
Dept: CARDIAC REHAB | Age: 63
Discharge: STILL ADMITTED OR ADMINISTRATIVE | End: 2023-11-15
Attending: INTERNAL MEDICINE

## 2023-11-15 PROCEDURE — 93798 PHYS/QHP OP CAR RHAB W/ECG: CPT

## 2023-11-16 ENCOUNTER — HOSPITAL ENCOUNTER (OUTPATIENT)
Dept: CARDIAC REHAB | Age: 63
Discharge: STILL ADMITTED OR ADMINISTRATIVE | End: 2023-11-16

## 2023-11-16 VITALS — BODY MASS INDEX: 22.31 KG/M2 | HEIGHT: 66 IN

## 2023-11-16 PROCEDURE — 93798 PHYS/QHP OP CAR RHAB W/ECG: CPT

## 2023-11-16 ASSESSMENT — PATIENT HEALTH QUESTIONNAIRE - PHQ9
7. TROUBLE CONCENTRATING ON THINGS, SUCH AS READING THE NEWSPAPER OR WATCHING TELEVISION: NOT AT ALL
SUM OF ALL RESPONSES TO PHQ QUESTIONS 1-9: 3
9. THOUGHTS THAT YOU WOULD BE BETTER OFF DEAD, OR OF HURTING YOURSELF: NOT AT ALL
2. FEELING DOWN, DEPRESSED OR HOPELESS: NOT AT ALL
3. TROUBLE FALLING OR STAYING ASLEEP OR SLEEPING TOO MUCH: MORE THAN HALF THE DAYS
6. FEELING BAD ABOUT YOURSELF - OR THAT YOU ARE A FAILURE OR HAVE LET YOURSELF OR YOUR FAMILY DOWN: NOT AT ALL
4. FEELING TIRED OR HAVING LITTLE ENERGY: SEVERAL DAYS
8. MOVING OR SPEAKING SO SLOWLY THAT OTHER PEOPLE COULD HAVE NOTICED. OR THE OPPOSITE, BEING SO FIGETY OR RESTLESS THAT YOU HAVE BEEN MOVING AROUND A LOT MORE THAN USUAL: NOT AT ALL
5. POOR APPETITE OR OVEREATING: NOT AT ALL
10. IF YOU CHECKED OFF ANY PROBLEMS, HOW DIFFICULT HAVE THESE PROBLEMS MADE IT FOR YOU TO DO YOUR WORK, TAKE CARE OF THINGS AT HOME, OR GET ALONG WITH OTHER PEOPLE: NOT DIFFICULT AT ALL
1. LITTLE INTEREST OR PLEASURE IN DOING THINGS: NOT AT ALL

## 2023-11-16 ASSESSMENT — EJECTION FRACTION: LVEF_VALUE: 60

## 2023-11-16 ASSESSMENT — LIFESTYLE VARIABLES: RISK_STRATIFICATION: LOWEST RISK - NONE, OR QUIT 6 MOS OR > AT TIME OF EVENT

## 2023-11-20 ENCOUNTER — HOSPITAL ENCOUNTER (OUTPATIENT)
Dept: CARDIAC REHAB | Age: 63
Discharge: STILL ADMITTED OR ADMINISTRATIVE | End: 2023-11-20

## 2023-11-20 PROCEDURE — 93798 PHYS/QHP OP CAR RHAB W/ECG: CPT

## 2023-11-22 ENCOUNTER — HOSPITAL ENCOUNTER (OUTPATIENT)
Dept: CARDIAC REHAB | Age: 63
Discharge: STILL ADMITTED OR ADMINISTRATIVE | End: 2023-11-22

## 2023-11-22 PROCEDURE — 93798 PHYS/QHP OP CAR RHAB W/ECG: CPT

## 2023-11-23 ENCOUNTER — APPOINTMENT (OUTPATIENT)
Dept: CARDIAC REHAB | Age: 63
End: 2023-11-23
Attending: INTERNAL MEDICINE

## 2023-11-27 ENCOUNTER — HOSPITAL ENCOUNTER (OUTPATIENT)
Dept: CARDIAC REHAB | Age: 63
Discharge: STILL ADMITTED OR ADMINISTRATIVE | End: 2023-11-27
Attending: INTERNAL MEDICINE

## 2023-11-27 PROCEDURE — 93798 PHYS/QHP OP CAR RHAB W/ECG: CPT

## 2023-11-29 ENCOUNTER — HOSPITAL ENCOUNTER (OUTPATIENT)
Dept: CARDIAC REHAB | Age: 63
Discharge: STILL ADMITTED OR ADMINISTRATIVE | End: 2023-11-29
Attending: INTERNAL MEDICINE

## 2023-11-29 PROCEDURE — 93798 PHYS/QHP OP CAR RHAB W/ECG: CPT

## 2023-11-30 ENCOUNTER — HOSPITAL ENCOUNTER (OUTPATIENT)
Dept: CARDIAC REHAB | Age: 63
Discharge: STILL ADMITTED OR ADMINISTRATIVE | End: 2023-11-30

## 2023-11-30 PROCEDURE — 93798 PHYS/QHP OP CAR RHAB W/ECG: CPT

## 2023-12-04 ENCOUNTER — HOSPITAL ENCOUNTER (OUTPATIENT)
Dept: CARDIAC REHAB | Age: 63
Discharge: STILL ADMITTED OR ADMINISTRATIVE | End: 2023-12-04

## 2023-12-04 PROCEDURE — 93798 PHYS/QHP OP CAR RHAB W/ECG: CPT

## 2023-12-06 ENCOUNTER — HOSPITAL ENCOUNTER (OUTPATIENT)
Dept: CARDIAC REHAB | Age: 63
Discharge: STILL ADMITTED OR ADMINISTRATIVE | End: 2023-12-06

## 2023-12-06 PROCEDURE — 93798 PHYS/QHP OP CAR RHAB W/ECG: CPT

## 2023-12-07 ENCOUNTER — HOSPITAL ENCOUNTER (OUTPATIENT)
Dept: CARDIAC REHAB | Age: 63
Discharge: STILL ADMITTED OR ADMINISTRATIVE | End: 2023-12-07
Attending: INTERNAL MEDICINE

## 2023-12-07 PROCEDURE — 93798 PHYS/QHP OP CAR RHAB W/ECG: CPT

## 2023-12-11 ENCOUNTER — HOSPITAL ENCOUNTER (OUTPATIENT)
Dept: CARDIAC REHAB | Age: 63
Discharge: STILL ADMITTED OR ADMINISTRATIVE | End: 2023-12-11
Attending: INTERNAL MEDICINE

## 2023-12-11 PROCEDURE — 93798 PHYS/QHP OP CAR RHAB W/ECG: CPT

## 2023-12-13 ENCOUNTER — HOSPITAL ENCOUNTER (OUTPATIENT)
Dept: CARDIAC REHAB | Age: 63
Discharge: STILL ADMITTED OR ADMINISTRATIVE | End: 2023-12-13
Attending: INTERNAL MEDICINE

## 2023-12-13 PROCEDURE — 93798 PHYS/QHP OP CAR RHAB W/ECG: CPT

## 2023-12-13 ASSESSMENT — EJECTION FRACTION: LVEF_VALUE: 60

## 2023-12-13 ASSESSMENT — PATIENT HEALTH QUESTIONNAIRE - PHQ9
3. TROUBLE FALLING OR STAYING ASLEEP OR SLEEPING TOO MUCH: MORE THAN HALF THE DAYS
10. IF YOU CHECKED OFF ANY PROBLEMS, HOW DIFFICULT HAVE THESE PROBLEMS MADE IT FOR YOU TO DO YOUR WORK, TAKE CARE OF THINGS AT HOME, OR GET ALONG WITH OTHER PEOPLE: NOT DIFFICULT AT ALL
8. MOVING OR SPEAKING SO SLOWLY THAT OTHER PEOPLE COULD HAVE NOTICED. OR THE OPPOSITE, BEING SO FIGETY OR RESTLESS THAT YOU HAVE BEEN MOVING AROUND A LOT MORE THAN USUAL: NOT AT ALL
4. FEELING TIRED OR HAVING LITTLE ENERGY: SEVERAL DAYS
2. FEELING DOWN, DEPRESSED OR HOPELESS: NOT AT ALL
9. THOUGHTS THAT YOU WOULD BE BETTER OFF DEAD, OR OF HURTING YOURSELF: NOT AT ALL
7. TROUBLE CONCENTRATING ON THINGS, SUCH AS READING THE NEWSPAPER OR WATCHING TELEVISION: NOT AT ALL
1. LITTLE INTEREST OR PLEASURE IN DOING THINGS: NOT AT ALL
SUM OF ALL RESPONSES TO PHQ QUESTIONS 1-9: 3
5. POOR APPETITE OR OVEREATING: NOT AT ALL
6. FEELING BAD ABOUT YOURSELF - OR THAT YOU ARE A FAILURE OR HAVE LET YOURSELF OR YOUR FAMILY DOWN: NOT AT ALL

## 2023-12-13 ASSESSMENT — LIFESTYLE VARIABLES: RISK_STRATIFICATION: LOWEST RISK - NONE, OR QUIT 6 MOS OR > AT TIME OF EVENT

## 2023-12-14 ENCOUNTER — HOSPITAL ENCOUNTER (OUTPATIENT)
Dept: CARDIAC REHAB | Age: 63
Discharge: STILL ADMITTED OR ADMINISTRATIVE | End: 2023-12-14

## 2023-12-14 PROCEDURE — 93798 PHYS/QHP OP CAR RHAB W/ECG: CPT

## 2023-12-18 ENCOUNTER — HOSPITAL ENCOUNTER (OUTPATIENT)
Dept: CARDIAC REHAB | Age: 63
Discharge: STILL ADMITTED OR ADMINISTRATIVE | End: 2023-12-18

## 2023-12-18 PROCEDURE — 93798 PHYS/QHP OP CAR RHAB W/ECG: CPT

## 2023-12-20 ENCOUNTER — HOSPITAL ENCOUNTER (OUTPATIENT)
Dept: CARDIAC REHAB | Age: 63
Discharge: STILL ADMITTED OR ADMINISTRATIVE | End: 2023-12-20

## 2023-12-20 PROCEDURE — 93798 PHYS/QHP OP CAR RHAB W/ECG: CPT

## 2023-12-21 ENCOUNTER — HOSPITAL ENCOUNTER (OUTPATIENT)
Dept: CARDIAC REHAB | Age: 63
Discharge: STILL ADMITTED OR ADMINISTRATIVE | End: 2023-12-21
Attending: INTERNAL MEDICINE

## 2023-12-21 PROCEDURE — 93798 PHYS/QHP OP CAR RHAB W/ECG: CPT

## 2023-12-25 ENCOUNTER — APPOINTMENT (OUTPATIENT)
Dept: CARDIAC REHAB | Age: 63
End: 2023-12-25
Attending: INTERNAL MEDICINE

## 2023-12-27 ENCOUNTER — TELEPHONE (OUTPATIENT)
Dept: CARDIAC REHAB | Age: 63
End: 2023-12-27

## 2023-12-27 ENCOUNTER — HOSPITAL ENCOUNTER (OUTPATIENT)
Dept: CARDIAC REHAB | Age: 63
Discharge: STILL ADMITTED OR ADMINISTRATIVE | End: 2023-12-27
Attending: INTERNAL MEDICINE

## 2023-12-27 ENCOUNTER — HOSPITAL ENCOUNTER (OUTPATIENT)
Dept: CARDIAC REHAB | Age: 63
Discharge: HOME | End: 2023-12-27
Attending: INTERNAL MEDICINE

## 2023-12-27 PROCEDURE — 93798 PHYS/QHP OP CAR RHAB W/ECG: CPT

## 2023-12-28 ENCOUNTER — HOSPITAL ENCOUNTER (OUTPATIENT)
Dept: CARDIAC REHAB | Age: 63
Discharge: STILL ADMITTED OR ADMINISTRATIVE | End: 2023-12-28

## 2023-12-28 PROCEDURE — 93798 PHYS/QHP OP CAR RHAB W/ECG: CPT

## 2024-01-01 ENCOUNTER — APPOINTMENT (OUTPATIENT)
Dept: CARDIAC REHAB | Age: 64
End: 2024-01-01
Attending: INTERNAL MEDICINE

## 2024-01-03 ENCOUNTER — HOSPITAL ENCOUNTER (OUTPATIENT)
Dept: CARDIAC REHAB | Age: 64
Discharge: STILL ADMITTED OR ADMINISTRATIVE | End: 2024-01-03
Attending: INTERNAL MEDICINE

## 2024-01-03 PROCEDURE — 93798 PHYS/QHP OP CAR RHAB W/ECG: CPT

## 2024-01-04 ENCOUNTER — HOSPITAL ENCOUNTER (OUTPATIENT)
Dept: CARDIAC REHAB | Age: 64
Discharge: HOME | End: 2024-01-04

## 2024-01-04 ENCOUNTER — TELEPHONE (OUTPATIENT)
Dept: CARDIAC REHAB | Age: 64
End: 2024-01-04

## 2024-01-08 ENCOUNTER — HOSPITAL ENCOUNTER (OUTPATIENT)
Dept: CARDIAC REHAB | Age: 64
Discharge: HOME | End: 2024-01-08

## 2024-01-10 ENCOUNTER — HOSPITAL ENCOUNTER (OUTPATIENT)
Dept: CARDIAC REHAB | Age: 64
Discharge: HOME | End: 2024-01-10

## 2024-01-10 ASSESSMENT — EJECTION FRACTION: LVEF_VALUE: 60

## 2024-01-10 ASSESSMENT — LIFESTYLE VARIABLES: RISK_STRATIFICATION: LOWEST RISK - NONE, OR QUIT 6 MOS OR > AT TIME OF EVENT

## 2024-01-11 ENCOUNTER — APPOINTMENT (OUTPATIENT)
Dept: CARDIAC REHAB | Age: 64
End: 2024-01-11

## 2024-01-15 ENCOUNTER — HOSPITAL ENCOUNTER (OUTPATIENT)
Dept: CARDIAC REHAB | Age: 64
Discharge: HOME | End: 2024-01-15
Attending: INTERNAL MEDICINE

## 2024-01-17 ENCOUNTER — HOSPITAL ENCOUNTER (OUTPATIENT)
Dept: CARDIAC REHAB | Age: 64
Discharge: HOME | End: 2024-01-17
Attending: INTERNAL MEDICINE

## 2024-01-18 ENCOUNTER — HOSPITAL ENCOUNTER (OUTPATIENT)
Dept: CARDIAC REHAB | Age: 64
Discharge: HOME | End: 2024-01-18

## 2024-01-22 ENCOUNTER — HOSPITAL ENCOUNTER (OUTPATIENT)
Dept: CARDIAC REHAB | Age: 64
Discharge: STILL ADMITTED OR ADMINISTRATIVE | End: 2024-01-22

## 2024-01-22 PROCEDURE — 93798 PHYS/QHP OP CAR RHAB W/ECG: CPT

## 2024-01-24 ENCOUNTER — HOSPITAL ENCOUNTER (OUTPATIENT)
Dept: CARDIAC REHAB | Age: 64
Discharge: HOME | End: 2024-01-24

## 2024-01-24 VITALS — HEIGHT: 66 IN | BODY MASS INDEX: 22.31 KG/M2

## 2024-01-24 ASSESSMENT — EJECTION FRACTION: LVEF_VALUE: 60

## 2024-01-24 ASSESSMENT — LIFESTYLE VARIABLES: RISK_STRATIFICATION: LOWEST RISK - NONE, OR QUIT 6 MOS OR > AT TIME OF EVENT

## 2024-01-24 ASSESSMENT — 6 MINUTE WALK TEST (6MWT): DID PATIENT PARTICIPATE IN 6 MINUTE WALK TEST: YES

## 2024-01-25 ENCOUNTER — APPOINTMENT (OUTPATIENT)
Dept: CARDIAC REHAB | Age: 64
End: 2024-01-25
Attending: INTERNAL MEDICINE

## 2024-01-26 ENCOUNTER — OFFICE VISIT (OUTPATIENT)
Dept: OPHTHALMOLOGY | Facility: CLINIC | Age: 64
End: 2024-01-26

## 2024-01-26 DIAGNOSIS — E10.9 TYPE 1 DIABETES MELLITUS WITHOUT RETINOPATHY (HCC): ICD-10-CM

## 2024-01-26 DIAGNOSIS — H25.13 AGE-RELATED NUCLEAR CATARACT OF BOTH EYES: ICD-10-CM

## 2024-01-26 DIAGNOSIS — H40.003 GLAUCOMA SUSPECT OF BOTH EYES: Primary | ICD-10-CM

## 2024-01-26 PROCEDURE — 2023F DILAT RTA XM W/O RTNOPTHY: CPT | Performed by: OPHTHALMOLOGY

## 2024-01-26 PROCEDURE — 92014 COMPRE OPH EXAM EST PT 1/>: CPT | Performed by: OPHTHALMOLOGY

## 2024-01-26 RX ORDER — METOPROLOL SUCCINATE 25 MG/1
25 TABLET, EXTENDED RELEASE ORAL DAILY
COMMUNITY

## 2024-01-26 RX ORDER — PANTOPRAZOLE SODIUM 40 MG/1
40 TABLET, DELAYED RELEASE ORAL
COMMUNITY

## 2024-01-26 RX ORDER — CLOPIDOGREL BISULFATE 75 MG/1
75 TABLET ORAL DAILY
COMMUNITY

## 2024-01-26 NOTE — ASSESSMENT & PLAN NOTE
Discussed with patient that he is a glaucoma suspect based on increased cupping of the optic nerves in both eyes.   Will not start medication, but will continue to observe.  Patient verbalized understanding.    Will see patient in 1 year for a diabetic exam and photos

## 2024-01-26 NOTE — ASSESSMENT & PLAN NOTE
Diabetes type I: no background retinopathy, no signs of neovascularization noted.  Discussed ocular and systemic benefits of blood sugar control.

## 2024-01-26 NOTE — PROGRESS NOTES
Viraj Mcgrath is a 63 year old male.    HPI:     HPI    Pt is here for diabetic eye exam.  Pt denies vision changes.  Pt had quadruple bypass surgery on September 20,2023.     Pt has been a diabetic for 60 years  Pt's diabetes is currently controlled by insulin  Pt checks BS every day   Pt's last blood sugar was  83 right now.  Last HA1C was 6.4 on 3/21/22  Endocrinologist: Dr. Luz Stearns  Last edited by Carito Greenwood OARGENIS on 1/26/2024  3:04 PM.        Patient History:  Past Medical History:   Diagnosis Date    Alcoholism (HCC)     Cataract 2013    OU    Diabetic retinopathy (HCC) 2013    IDDM with BDR    Hyperlipidemia     Type 1 diabetes mellitus with diabetic polyneuropathy (HCC) 1/17/2017    Type I (juvenile type) diabetes mellitus without mention of complication, not stated as uncontrolled        Surgical History: Viraj Mcgrath has a past surgical history that includes other surgical history (Left, 02/20/2009) (broke L ankle ice skating).    Family History   Problem Relation Age of Onset    Diabetes Paternal Grandmother         type 2    Glaucoma Neg     Macular degeneration Neg        Social History:   Social History     Socioeconomic History    Marital status:    Tobacco Use    Smoking status: Never    Smokeless tobacco: Never   Vaping Use    Vaping Use: Never used   Substance and Sexual Activity    Alcohol use: No     Comment: yes - admits that he is an alcoholic    Drug use: No       Medications:  Current Outpatient Medications   Medication Sig Dispense Refill    clopidogrel 75 MG Oral Tab Take 1 tablet (75 mg total) by mouth daily.      pantoprazole 40 MG Oral Tab EC Take 1 tablet (40 mg total) by mouth every morning before breakfast.      metoprolol succinate ER 25 MG Oral Tablet 24 Hr Take 1 tablet (25 mg total) by mouth daily.      Glucose Blood (CONTOUR NEXT TEST) In Vitro Strip Check 7 times daily Dx Type 1 diabetes with hypoglycemia 600 strip 0    Insulin Aspart Pen (NOVOLOG FLEXPEN)  100 UNIT/ML Subcutaneous Solution Pen-injector TO USE CARB AND CORRECTION SCALE, MAX DOSE OF 40 UNITS SUBCUTANEOUS DAILY 45 mL 0    insulin glargine (LANTUS SOLOSTAR) 100 UNIT/ML Subcutaneous Solution Pen-injector INJECT 20 UNITS UNDER THE SKIN ONCE DAILY 45 mL 1    Levothyroxine Sodium 75 MCG Oral Tab TAKE 1 TABLET(75 MCG) BY MOUTH DAILY 90 tablet 3    Glucagon, rDNA, (GLUCAGON EMERGENCY) 1 MG Injection Kit To be injected in the thigh muscle in case of low glucose if unable to take in oral glucose 1 kit 1    lisinopril 5 MG Oral Tab Take 1 tablet (5 mg total) by mouth daily. 30 tablet 1    Insulin Pen Needle (BD PEN NEEDLE RIKI U/F) 32G X 4 MM Does not apply Misc Use 5 times daily 500 each 3    Rosuvastatin Calcium 20 MG Oral Tab Take 1 tablet (20 mg total) by mouth nightly. 100 tablet 3    aspirin 81 MG Oral Tab Take 1 tablet (81 mg total) by mouth daily. 100 tablet 3       Allergies:  No Known Allergies    ROS:       PHYSICAL EXAM:     Base Eye Exam       Visual Acuity (Snellen - Linear)         Right Left    Dist cc 20/20 20/20 -2    Near cc 20/20 20/20      Correction: Glasses              Tonometry (Applanation, 3:10 PM)         Right Left    Pressure 15 14              Pachymetry (11/18/2020)         Right Left    Thickness 579/-2 575/-2              Pupils         Pupils    Right PERRL    Left PERRL              Visual Fields         Left Right     Full Full              Extraocular Movement         Right Left     Full, Ortho Full, Ortho              Neuro/Psych       Oriented x3: Yes    Mood/Affect: Normal              Dilation       Both eyes: 1.0% Mydriacyl and 2.5% Misael Synephrine @ 3:11 PM                  Slit Lamp and Fundus Exam       Slit Lamp Exam         Right Left    Lids/Lashes Dermatochalasis, Meibomian gland dysfunction Dermatochalasis, Meibomian gland dysfunction    Conjunctiva/Sclera Nasal/temp pinguecula Nasal/temp pinguecula    Cornea 1+ Guttata, trace pigment 1+ Guttata, trace pigment     Anterior Chamber Deep and quiet Deep and quiet    Iris Normal, No transillumination defects Normal, No transillumination defects    Lens 2+ Nuclear sclerosis, Trace Cortical cataract 2+ Nuclear sclerosis, Trace Cortical cataract    Vitreous Clear Clear              Fundus Exam         Right Left    Disc Good rim, Temporal crescent Sloping margin, Temporal crescent    C/D Ratio 0.4 0.7    Macula Normal- no BDR Normal- no BDR    Vessels Normal Normal    Periphery Normal Normal                  Refraction       Wearing Rx         Sphere Cylinder Axis Add    Right -3.25 +1.50 110 +2.50    Left -2.75 +0.50 055 +2.50      Type: Progressive bifocal              Manifest Refraction    Declined refraction                    ASSESSMENT/PLAN:     Diagnoses and Plan:     Glaucoma suspect of both eyes  Discussed with patient that he is a glaucoma suspect based on increased cupping of the optic nerves in both eyes.   Will not start medication, but will continue to observe.  Patient verbalized understanding.    Will see patient in 1 year for a diabetic exam and photos    Type 1 diabetes mellitus without retinopathy (HCC)  Diabetes type I: no background retinopathy, no signs of neovascularization noted.  Discussed ocular and systemic benefits of blood sugar control.    Age-related nuclear cataract of both eyes  Discussed early cataracts with patient. Told patient that cataracts are age appropriate and they are not surgical at this time.  No treatment recommended at this time.         No orders of the defined types were placed in this encounter.      Meds This Visit:  Requested Prescriptions      No prescriptions requested or ordered in this encounter        Follow up instructions:  Return in about 1 year (around 1/26/2025) for Diabetic eye exam, Photos.    1/26/2024  Scribed by: Vitor Esqueda MD

## 2024-01-26 NOTE — ASSESSMENT & PLAN NOTE
Discussed early cataracts with patient. Told patient that cataracts are age appropriate and they are not surgical at this time.  No treatment recommended at this time.

## 2024-01-26 NOTE — PATIENT INSTRUCTIONS
Glaucoma suspect of both eyes  Discussed with patient that he is a glaucoma suspect based on increased cupping of the optic nerves in both eyes.   Will not start medication, but will continue to observe.  Patient verbalized understanding.    Will see patient in 1 year for a diabetic exam and photos    Type 1 diabetes mellitus without retinopathy (HCC)  Diabetes type I: no background retinopathy, no signs of neovascularization noted.  Discussed ocular and systemic benefits of blood sugar control.

## 2024-01-29 ENCOUNTER — APPOINTMENT (OUTPATIENT)
Dept: CARDIAC REHAB | Age: 64
End: 2024-01-29
Attending: INTERNAL MEDICINE

## 2024-01-31 ENCOUNTER — APPOINTMENT (OUTPATIENT)
Dept: CARDIAC REHAB | Age: 64
End: 2024-01-31
Attending: INTERNAL MEDICINE

## 2024-02-01 ENCOUNTER — APPOINTMENT (OUTPATIENT)
Dept: CARDIAC REHAB | Age: 64
End: 2024-02-01
Attending: INTERNAL MEDICINE

## 2024-02-05 ENCOUNTER — APPOINTMENT (OUTPATIENT)
Dept: CARDIAC REHAB | Age: 64
End: 2024-02-05
Attending: INTERNAL MEDICINE

## 2024-02-07 ENCOUNTER — APPOINTMENT (OUTPATIENT)
Dept: CARDIAC REHAB | Age: 64
End: 2024-02-07
Attending: INTERNAL MEDICINE

## 2024-08-27 ENCOUNTER — TELEPHONE (OUTPATIENT)
Dept: OPHTHALMOLOGY | Facility: CLINIC | Age: 64
End: 2024-08-27

## (undated) DEVICE — DRAPE 2 INCS FILM ANTIMICROBIAL 33X23IN SURG IOBAN STRL

## (undated) DEVICE — GLOVE SURG 5.5 PROTEXIS LF CRM PF BEAD CUFF STRL PLISPRN

## (undated) DEVICE — GOWN SURG XL L4 IMPRV REINFORCE SET IN SLV STRL LF DISP BLUE

## (undated) DEVICE — DRAPE ADH STRIP SM TWL MATTE FNSH 17X11IN SURG STRDRP STRL

## (undated) DEVICE — CATH IMPULSE FL3.5 6F 100CM

## (undated) DEVICE — ELECTRODE PT RTN C30- LB 9FT CORD NONIRRITATE NONSENSITIZE

## (undated) DEVICE — KIT SUT 30IN 2-0 KC-2 BRAID DEKNATEL 5-PAK SILKY 2 PLYDK

## (undated) DEVICE — BANDAGE CMPR VELCLOSE 5YDX4IN ELASTIC VELCRO POLY YARN LYCRA

## (undated) DEVICE — MATTRESS TRNSF 34IN DISP

## (undated) DEVICE — SUTURE PROLENE VISI-BLK 7-0 BV-1 18IN PP 2 ARM MONO NABSB 8701H

## (undated) DEVICE — GLOVE SURG 7 BIOGEL M LTX STRAW PF TXTR BEAD CUFF N-PYRG

## (undated) DEVICE — GOWN SURG LG L3 NONREINFORCE SET IN SLV STRL LF DISP BLUE

## (undated) DEVICE — Device

## (undated) DEVICE — GLOVE SURG 6.5 PROTEXIS LF CRM PF BEAD CUFF STRL PLISPRN

## (undated) DEVICE — DRESSING TRANS 13.75X4IN ADH WTPRF OPST PU STRL LF DISP

## (undated) DEVICE — BLADE SAW 1MM 30.5MM THK0.6MM STRNM SYS 6

## (undated) DEVICE — TUBING INSFL PNEUMOCLEAR SET HFL

## (undated) DEVICE — GLOVE SURG 7.5 PROTEXIS LF CRM PF SMTH BEAD CUFF STRL

## (undated) DEVICE — SOLUTION IRRIGATION TITAN XL 0.9% SODIUM CHLORIDE

## (undated) DEVICE — SHIELD RAD RADPAD RAD PRTC STRL FEM ENTRY ANGIO

## (undated) DEVICE — ADAPTER TBG 2 MALE LL DEHP-FR STRL LF MEDEX 1IN DISP .1ML IV

## (undated) DEVICE — SOLUTION IV 1000ML 0.9% NACL PVC PH 5 PLASTIC CNTNR PRSV FR

## (undated) DEVICE — CATH IMPULSE PIG 145 5F 110CM

## (undated) DEVICE — DRAIN INCS ATR OASIS PLASTIC CHEST THOR TUBE DRY SCT

## (undated) DEVICE — STRIP 4X.5IN STRSTRP IPHR POLY POR ADH REINFORCE

## (undated) DEVICE — TRAY CATH CMP CR LUBRI-SIL IC STLK SURESTEP 16FR FOLEY URMTR

## (undated) DEVICE — APPLICATOR 70% ISO ALC 2% CHG 10.5ML BD CHLRPRP SCRUB TEAL

## (undated) DEVICE — SYRINGE 5ML GRAD N-PYRG DEHP-FR PVC FREE STRL MED LF DISP LL

## (undated) DEVICE — CONTAINER TRNSF C600ML CPLR STRL TRNSF PK LF

## (undated) DEVICE — SYRINGE 20ML GRAD STRL MED DISP LL

## (undated) DEVICE — GLOVE SURG 6.5 DRMPRN ULTRA LF DARK GRN PF SMTH BEAD CUFF

## (undated) DEVICE — WIRE MYOWR2 6 .5 CRC CCS1 14X3IN 2 MONO SUT SS STRNM

## (undated) DEVICE — PUNCH 4MM LONG HNDL AOR

## (undated) DEVICE — SUTURE ETHIBOND EXCEL SUTUPAK 4-0 30IN BRAID TIES 12 STRN X303H

## (undated) DEVICE — ADHESIVE PREMIERPRO EXOFIN 1ML HVISC TUBE SOFT FLXB APL

## (undated) DEVICE — INSERT CLAMP STLTH 3 60MM LATIS STRL LF DISP

## (undated) DEVICE — DECANTER FLUID DSPNSR BAG BAJ DISP STRL LF ASEPTIC TRNSF

## (undated) DEVICE — CATHETER DRN 32FR 23IN STRGT TPR TIP KINK RST 6 EYE N-PYRG

## (undated) DEVICE — CATHETER DRN 32FR 23IN RT ANG 5 EYLT TPR CNCT TIP THRMSNS

## (undated) DEVICE — DRAPE L4 APRTR BRR PRTC 42X29IN 4.5X3.5IN SURG ACTI-GARD

## (undated) DEVICE — BANDAGE CMPR VELCLOSE 5YDX6IN HOOK SLFCLSR KNIT ELASTIC MED

## (undated) DEVICE — CANNULA NSL ADPR SET NOMOLINE

## (undated) DEVICE — SOLUTION IRR 3000ML 0.9% NACL ARTHMTC LF

## (undated) DEVICE — KIT INTRO 6FR 21GA 10CM 45CM .021IN 35MM FLEX STRGT SHTH DIL

## (undated) DEVICE — BAG WST 48IN SPK FLTR RLLR CLAMP FEMALE LL TUBE VENT MERIT

## (undated) DEVICE — CANNULA PRFSN 3MM 2.5IN 3MM 1 WAY VLV FEMALE LL DCKBL BLUNT

## (undated) DEVICE — SYSTEM VSL HRVT VIRTUOSAPH + DSCT HRVT ROD TROCAR PTFE ENDO

## (undated) DEVICE — GOWN SURG XL L3 NONREINFORCE SET IN SLV STRL LF DISP BLUE

## (undated) DEVICE — SUTURE PROLENE 6-0 BV-1 18IN 2 ARM MONO NABSB BLUE 8806H

## (undated) DEVICE — CANISTER SCT 90D 3000ML DISP LF MDVC GUARDIAN LOCK LID OVFLW

## (undated) DEVICE — HEMOSTAT ABS 4X3IN SURGICEL NU-KNIT STRL

## (undated) DEVICE — GLOVE SURG 7 PROTEXIS LF CRM PF BEAD CUFF STRL PLISPRN 12IN

## (undated) DEVICE — CABLE TRNDCR TRANSPAC PVC MALE TO FEMALE CNCT HPRS

## (undated) DEVICE — STAPLER SKIN 3.9X6.9MM WIDE RECT 35 CNT ROTATE HEAD RCHT

## (undated) DEVICE — GUIDEWIRE STRT 10CM 260CM J CRV TPR .035IN VASC PTFE PERIPH

## (undated) DEVICE — SYRINGE 8ML ROTATE MALE LL ADPR RING GRIP MED INJECT8 CCS

## (undated) DEVICE — SOLUTION IV 1000ML 0.9% NACL PVC FREE DEHP-FR E3 LF

## (undated) DEVICE — CATHETER 6FR JR4 CRV 100CM LG INNER LUM RADOPQ SLCT INFNT

## (undated) DEVICE — DRESSING TRANS 4.75X4IN ADH HPOAL WTPRF TEGADERM PU STD STRL

## (undated) DEVICE — HEMOSTAT CMPR VASC REG 24CM

## (undated) DEVICE — SUTURE 7 CCS 18IN SS MONO 4 STRN NABSB M655G

## (undated) DEVICE — DEVICE BLWRMSTR ACCUMIST ATCH TBG SET

## (undated) DEVICE — CATHETER 6FR FL4 CRV 125CM 2 WIRE BRAID STIFF PROX SHAFT

## (undated) DEVICE — SPONGE GAUZE 4X4IN CTN 16 PLY WOVEN FOLD EDGE STRL LF

## (undated) DEVICE — SYRINGE 30ML CONC TIP GRAD N-PYRG DEHP-FR STRL MED LF DISP

## (undated) DEVICE — GLOVE SURG 6 PROTEXIS LF CRM PF BEAD CUFF STRL PLISPRN 11.5

## (undated) DEVICE — SOLUTION IRR 1000ML 0.9% NACL PLASTIC POUR BTL ISTNC N-PYRG

## (undated) DEVICE — SOLUTION IV 1000ML LR PLASTIC CNTNR VIAFLEX LF

## (undated) DEVICE — SPONGE LAPAROTOMY 18X18IN STERILE 5 PK

## (undated) NOTE — LETTER
January 26, 2024      No Recipients     Patient: Viraj Mcgrath   YOB: 1960   Date of Visit: 1/26/2024       Dear Dr. Lowe Recipients:    Thank you for referring Viraj Mcgrath to me for evaluation. Here is my assessment and plan of care:    Viraj Mcgrath is a 63 year old male.    HPI:     HPI    Pt is here for diabetic eye exam.  Pt denies vision changes.  Pt had quadruple bypass surgery on September 20,2023.     Pt has been a diabetic for 60 years  Pt's diabetes is currently controlled by insulin  Pt checks BS every day   Pt's last blood sugar was  83 right now.  Last HA1C was 6.4 on 3/21/22  Endocrinologist: Dr. Luz Stearns  Last edited by Carito Greenwood OARGENIS on 1/26/2024  3:04 PM.        Patient History:  Past Medical History:   Diagnosis Date    Alcoholism (HCC)     Cataract 2013    OU    Diabetic retinopathy (HCC) 2013    IDDM with BDR    Hyperlipidemia     Type 1 diabetes mellitus with diabetic polyneuropathy (HCC) 1/17/2017    Type I (juvenile type) diabetes mellitus without mention of complication, not stated as uncontrolled        Surgical History: Viraj Mcgrath has a past surgical history that includes other surgical history (Left, 02/20/2009) (broke L ankle ice skating).    Family History   Problem Relation Age of Onset    Diabetes Paternal Grandmother         type 2    Glaucoma Neg     Macular degeneration Neg        Social History:   Social History     Socioeconomic History    Marital status:    Tobacco Use    Smoking status: Never    Smokeless tobacco: Never   Vaping Use    Vaping Use: Never used   Substance and Sexual Activity    Alcohol use: No     Comment: yes - admits that he is an alcoholic    Drug use: No       Medications:  Current Outpatient Medications   Medication Sig Dispense Refill    clopidogrel 75 MG Oral Tab Take 1 tablet (75 mg total) by mouth daily.      pantoprazole 40 MG Oral Tab EC Take 1 tablet (40 mg total) by mouth every morning before  breakfast.      metoprolol succinate ER 25 MG Oral Tablet 24 Hr Take 1 tablet (25 mg total) by mouth daily.      Glucose Blood (CONTOUR NEXT TEST) In Vitro Strip Check 7 times daily Dx Type 1 diabetes with hypoglycemia 600 strip 0    Insulin Aspart Pen (NOVOLOG FLEXPEN) 100 UNIT/ML Subcutaneous Solution Pen-injector TO USE CARB AND CORRECTION SCALE, MAX DOSE OF 40 UNITS SUBCUTANEOUS DAILY 45 mL 0    insulin glargine (LANTUS SOLOSTAR) 100 UNIT/ML Subcutaneous Solution Pen-injector INJECT 20 UNITS UNDER THE SKIN ONCE DAILY 45 mL 1    Levothyroxine Sodium 75 MCG Oral Tab TAKE 1 TABLET(75 MCG) BY MOUTH DAILY 90 tablet 3    Glucagon, rDNA, (GLUCAGON EMERGENCY) 1 MG Injection Kit To be injected in the thigh muscle in case of low glucose if unable to take in oral glucose 1 kit 1    lisinopril 5 MG Oral Tab Take 1 tablet (5 mg total) by mouth daily. 30 tablet 1    Insulin Pen Needle (BD PEN NEEDLE RIKI U/F) 32G X 4 MM Does not apply Misc Use 5 times daily 500 each 3    Rosuvastatin Calcium 20 MG Oral Tab Take 1 tablet (20 mg total) by mouth nightly. 100 tablet 3    aspirin 81 MG Oral Tab Take 1 tablet (81 mg total) by mouth daily. 100 tablet 3       Allergies:  No Known Allergies    ROS:       PHYSICAL EXAM:     Base Eye Exam       Visual Acuity (Snellen - Linear)         Right Left    Dist cc 20/20 20/20 -2    Near cc 20/20 20/20      Correction: Glasses              Tonometry (Applanation, 3:10 PM)         Right Left    Pressure 15 14              Pachymetry (11/18/2020)         Right Left    Thickness 579/-2 575/-2              Pupils         Pupils    Right PERRL    Left PERRL              Visual Fields         Left Right     Full Full              Extraocular Movement         Right Left     Full, Ortho Full, Ortho              Neuro/Psych       Oriented x3: Yes    Mood/Affect: Normal              Dilation       Both eyes: 1.0% Mydriacyl and 2.5% Misael Synephrine @ 3:11 PM                  Slit Lamp and Fundus Exam        Slit Lamp Exam         Right Left    Lids/Lashes Dermatochalasis, Meibomian gland dysfunction Dermatochalasis, Meibomian gland dysfunction    Conjunctiva/Sclera Nasal/temp pinguecula Nasal/temp pinguecula    Cornea 1+ Guttata, trace pigment 1+ Guttata, trace pigment    Anterior Chamber Deep and quiet Deep and quiet    Iris Normal, No transillumination defects Normal, No transillumination defects    Lens 2+ Nuclear sclerosis, Trace Cortical cataract 2+ Nuclear sclerosis, Trace Cortical cataract    Vitreous Clear Clear              Fundus Exam         Right Left    Disc Good rim, Temporal crescent Sloping margin, Temporal crescent    C/D Ratio 0.4 0.7    Macula Normal- no BDR Normal- no BDR    Vessels Normal Normal    Periphery Normal Normal                  Refraction       Wearing Rx         Sphere Cylinder Axis Add    Right -3.25 +1.50 110 +2.50    Left -2.75 +0.50 055 +2.50      Type: Progressive bifocal              Manifest Refraction    Declined refraction                    ASSESSMENT/PLAN:     Diagnoses and Plan:     Glaucoma suspect of both eyes  Discussed with patient that he is a glaucoma suspect based on increased cupping of the optic nerves in both eyes.   Will not start medication, but will continue to observe.  Patient verbalized understanding.    Will see patient in 1 year for a diabetic exam and photos    Type 1 diabetes mellitus without retinopathy (HCC)  Diabetes type I: no background retinopathy, no signs of neovascularization noted.  Discussed ocular and systemic benefits of blood sugar control.    Age-related nuclear cataract of both eyes  Discussed early cataracts with patient. Told patient that cataracts are age appropriate and they are not surgical at this time.  No treatment recommended at this time.         No orders of the defined types were placed in this encounter.      Meds This Visit:  Requested Prescriptions      No prescriptions requested or ordered in this encounter        Follow up  instructions:  Return in about 1 year (around 1/26/2025) for Diabetic eye exam, Photos.    1/26/2024  Scribed by: Vitor Esqueda MD        If you have questions, please do not hesitate to call me. I look forward to following Viraj along with you.    Sincerely,        Vitor Esqueda MD        CC:   No Recipients    Document electronically generated by: Vitor Esqueda MD

## (undated) NOTE — LETTER
11/19/2020            [phone]       Dear Rita Caballero,    The visual field test you took on 11/18/2020 indicated normal field of vision in both eyes.   An optic nerve analysis showed normal retinal

## (undated) NOTE — LETTER
October 1, 2020    Sandra Panchal, [address] [place] [address]  [facility]     Patient: Juan Carlos Araujo   YOB: 1960   Date of Visit: 10/1/2020       Dear Dr. Ysabel Toure MD:    Thank you for referring Janeon Marlo to me for ev • Continuous Blood Gluc Sensor (FREESTYLE BRIA 14 DAY SENSOR) Does not apply Misc Use for 14 days 6 each 1   • Continuous Blood Gluc  (FREESTYLE BRIA 14 DAY READER) Does not apply Device Use to scan 1 Device 0   • Insulin Aspart Pen (NOVOLOG FLEX Iris Normal, No transillumination defects Normal, No transillumination defects    Lens 1+ Nuclear sclerosis, Trace Cortical cataract 1+ Nuclear sclerosis, Trace Cortical cataract    Vitreous Clear Clear          Fundus Exam       Right Left    Disc Good r Requested Prescriptions      No prescriptions requested or ordered in this encounter        Follow up instructions:  Return for Next avail, VF,OCT and pachy w/ no MD, If glaucoma diagnostics are wnl, then 1 yr DM EE.    10/1/2020  Scribed by: Rea Spencer